# Patient Record
Sex: FEMALE | Race: WHITE | NOT HISPANIC OR LATINO | Employment: FULL TIME | URBAN - METROPOLITAN AREA
[De-identification: names, ages, dates, MRNs, and addresses within clinical notes are randomized per-mention and may not be internally consistent; named-entity substitution may affect disease eponyms.]

---

## 2023-11-04 PROBLEM — T22.011A: Status: ACTIVE | Noted: 2023-11-04

## 2023-11-04 PROBLEM — M48.062 LUMBAR STENOSIS WITH NEUROGENIC CLAUDICATION: Status: ACTIVE | Noted: 2023-11-04

## 2023-11-04 PROBLEM — M54.30 SCIATICA: Status: ACTIVE | Noted: 2023-11-04

## 2023-11-04 PROBLEM — M51.16 LUMBAR DISC HERNIATION WITH RADICULOPATHY: Status: ACTIVE | Noted: 2023-11-04

## 2023-11-04 PROBLEM — R07.9 CHEST PAIN: Status: ACTIVE | Noted: 2023-11-04

## 2023-11-04 PROBLEM — F41.9 ANXIETY: Status: ACTIVE | Noted: 2023-11-04

## 2023-11-04 PROBLEM — L30.9 ECZEMA: Status: ACTIVE | Noted: 2023-11-04

## 2023-11-04 PROBLEM — R22.32: Status: ACTIVE | Noted: 2023-11-04

## 2023-11-04 PROBLEM — J45.909 ASTHMA (HHS-HCC): Status: ACTIVE | Noted: 2023-11-04

## 2023-11-04 PROBLEM — M54.17 LUMBOSACRAL NEURITIS: Status: ACTIVE | Noted: 2023-11-04

## 2023-11-04 RX ORDER — SERTRALINE HYDROCHLORIDE 50 MG/1
1 TABLET, FILM COATED ORAL DAILY
COMMUNITY
Start: 2019-02-14

## 2023-11-04 RX ORDER — FLUCONAZOLE 150 MG/1
1 TABLET ORAL ONCE
COMMUNITY
Start: 2021-06-25

## 2023-11-04 RX ORDER — IBUPROFEN 600 MG/1
1 TABLET ORAL EVERY 6 HOURS PRN
COMMUNITY
Start: 2019-07-15

## 2023-11-04 RX ORDER — LEVONORGESTREL 52 MG/1
INTRAUTERINE DEVICE INTRAUTERINE
COMMUNITY
Start: 2019-06-25

## 2023-11-04 RX ORDER — CLOBETASOL PROPIONATE 0.5 MG/ML
LOTION TOPICAL 2 TIMES DAILY PRN
COMMUNITY

## 2023-11-04 RX ORDER — DOCUSATE SODIUM 100 MG/1
1 CAPSULE, LIQUID FILLED ORAL 2 TIMES DAILY
COMMUNITY
Start: 2019-07-15

## 2023-11-04 RX ORDER — CLONAZEPAM 0.5 MG/1
TABLET ORAL
COMMUNITY

## 2023-11-06 ENCOUNTER — APPOINTMENT (OUTPATIENT)
Dept: OBSTETRICS AND GYNECOLOGY | Facility: CLINIC | Age: 38
End: 2023-11-06
Payer: COMMERCIAL

## 2023-12-11 ENCOUNTER — OFFICE VISIT (OUTPATIENT)
Dept: OBSTETRICS AND GYNECOLOGY | Facility: CLINIC | Age: 38
End: 2023-12-11
Payer: COMMERCIAL

## 2023-12-11 VITALS
HEIGHT: 62 IN | SYSTOLIC BLOOD PRESSURE: 102 MMHG | BODY MASS INDEX: 22.08 KG/M2 | DIASTOLIC BLOOD PRESSURE: 60 MMHG | WEIGHT: 120 LBS

## 2023-12-11 DIAGNOSIS — Z01.419 WELL WOMAN EXAM WITH ROUTINE GYNECOLOGICAL EXAM: Primary | ICD-10-CM

## 2023-12-11 PROCEDURE — 1036F TOBACCO NON-USER: CPT | Performed by: OBSTETRICS & GYNECOLOGY

## 2023-12-11 PROCEDURE — 99395 PREV VISIT EST AGE 18-39: CPT | Performed by: OBSTETRICS & GYNECOLOGY

## 2023-12-11 PROCEDURE — 87624 HPV HI-RISK TYP POOLED RSLT: CPT

## 2023-12-11 PROCEDURE — 88175 CYTOPATH C/V AUTO FLUID REDO: CPT

## 2023-12-11 NOTE — PROGRESS NOTES
"Catarina Marquez is a 38 y.o. female who is here for a routine exam.     Patient has an IUD in place and states she randomly has spotting for only a few days    Sexually active: Mirena IUD  Mirena inserted June 2019.  Due out June 2027    Regular self breast exam: yes  No concerns on her exams    Menstrual History:  OB History    No obstetric history on file.        Patient's last menstrual period was 12/08/2023.         Review of Systems  All Normal Review of Systems  Constitutional: no fever, no chills, no recent weight gain, no recent weight loss and no fatigue.    Gastrointestinal: no abdominal pain, no constipation, no nausea, no diarrhea and no vomiting.    Genitourinary: no dysuria, no urinary incontinence, no vaginal dryness, no vaginal itching, no dyspareunia, no pelvic pain, no dysmenorrhea, no sexual problems, no change in urinary frequency, no vaginal discharge, no unexplained vaginal bleeding and no lesion/sore.    Breasts: No masses.  No nipple discharge.  No redness    Objective   /60   Ht 1.575 m (5' 2\")   Wt 54.4 kg (120 lb)   LMP 12/08/2023 Comment: IUD  BMI 21.95 kg/m²     OBGyn Exam   Physical Exam  Constitutional: Alert and in no acute distress. Well developed, well nourished.   Head and Face: Head and face: Normal.    Eyes: Normal external exam - nonicteric sclera, extraocular movements intact (EOMI) and no ptosis.   Ears, Nose, Mouth, and Throat: External inspection of ears and nose: Normal.    Neck: No neck asymmetry. Supple. Thyroid not enlarged and there were no palpable thyroid nodules.   Chest: Breasts: Normal appearance, no nipple discharge and no skin changes. Palpation of breasts and axillae: No palpable mass and no axillary lymphadenopathy.   Abdomen: Soft nontender; no abdominal mass palpated. No organomegaly. No hernias.     Genitourinary:   External genitalia: Normal. No inguinal lymphadenopathy. Bartholin's Urethral and Skenes Glands: Normal. Urethra: Normal.    Bladder: " Normal on palpation.   Vagina: Normal. No discharge.  End of cycle with trace blood  Cervix: Normal.  IUD strings visualized and palpated  Uterus: Normal.    Right Adnexa/parametria: Normal.  Left Adnexa/parametria: Normal.    Inspection of Perianal Area: Normal.     Musculoskeletal: No joint swelling seen, normal movements of all extremities.   Skin: Normal skin color and pigmentation, normal skin turgor, and no rash.   Neurologic: Non-focal. Grossly intact.   Psychiatric: Alert and oriented x 3. Affect normal to patient baseline. Mood: Appropriate.      Assessment/Plan   1) Annual exam:  Pap with HPV testing completed today  Mirena IUD in place.  New FDA guidelines reviewed and her current IUD will be good through June 2027    Thank you again for your visit to our office today  Please follow-up as needed or in 1 year with your annual exam

## 2024-09-05 ENCOUNTER — TELEPHONE (OUTPATIENT)
Dept: OBSTETRICS AND GYNECOLOGY | Facility: CLINIC | Age: 39
End: 2024-09-05
Payer: COMMERCIAL

## 2024-09-05 DIAGNOSIS — N39.0 ACUTE UTI: Primary | ICD-10-CM

## 2024-09-05 RX ORDER — SULFAMETHOXAZOLE AND TRIMETHOPRIM 800; 160 MG/1; MG/1
1 TABLET ORAL 2 TIMES DAILY
Qty: 14 TABLET | Refills: 0 | Status: SHIPPED | OUTPATIENT
Start: 2024-09-05 | End: 2024-09-12

## 2024-09-05 NOTE — TELEPHONE ENCOUNTER
Catarina JACOB Marquez called in stating that she has burning and pressure and thinks its a UTI.      Last Office Visit: 12/11/2023  Next Office Visit: 12/16/2024  Med: unsure   Pharmacy: [facility]

## 2024-10-14 DIAGNOSIS — J30.81 ALLERGY TO DOGS: Primary | ICD-10-CM

## 2024-10-14 RX ORDER — EPINEPHRINE 0.3 MG/.3ML
0.3 INJECTION SUBCUTANEOUS ONCE
Qty: 1 EACH | Refills: 0 | Status: SHIPPED | OUTPATIENT
Start: 2024-10-14 | End: 2024-10-14

## 2024-12-16 ENCOUNTER — APPOINTMENT (OUTPATIENT)
Dept: OBSTETRICS AND GYNECOLOGY | Facility: CLINIC | Age: 39
End: 2024-12-16
Payer: COMMERCIAL

## 2024-12-16 VITALS
WEIGHT: 123 LBS | HEIGHT: 62 IN | BODY MASS INDEX: 22.63 KG/M2 | DIASTOLIC BLOOD PRESSURE: 66 MMHG | SYSTOLIC BLOOD PRESSURE: 106 MMHG

## 2024-12-16 DIAGNOSIS — Z30.431 SURVEILLANCE FOR BIRTH CONTROL, INTRAUTERINE DEVICE: ICD-10-CM

## 2024-12-16 DIAGNOSIS — Z01.419 WELL WOMAN EXAM WITH ROUTINE GYNECOLOGICAL EXAM: Primary | ICD-10-CM

## 2024-12-16 PROCEDURE — 99395 PREV VISIT EST AGE 18-39: CPT | Performed by: OBSTETRICS & GYNECOLOGY

## 2024-12-16 PROCEDURE — 3008F BODY MASS INDEX DOCD: CPT | Performed by: OBSTETRICS & GYNECOLOGY

## 2024-12-16 PROCEDURE — 87624 HPV HI-RISK TYP POOLED RSLT: CPT

## 2024-12-16 PROCEDURE — 1036F TOBACCO NON-USER: CPT | Performed by: OBSTETRICS & GYNECOLOGY

## 2024-12-16 ASSESSMENT — PATIENT HEALTH QUESTIONNAIRE - PHQ9
SUM OF ALL RESPONSES TO PHQ9 QUESTIONS 1 AND 2: 0
2. FEELING DOWN, DEPRESSED OR HOPELESS: NOT AT ALL
1. LITTLE INTEREST OR PLEASURE IN DOING THINGS: NOT AT ALL

## 2024-12-16 NOTE — PROGRESS NOTES
"Catarina Marquez is a 39 y.o. female who is here for a annual exam.     Patient with random spotting with her Mirena IUD      Sexually active: Mirena IUD in place and good through     Regular self breast exam: yes  no concerns on her exams    Menstrual History:  OB History          4    Para   4    Term                AB        Living   4         SAB        IAB        Ectopic        Multiple        Live Births                    No LMP recorded (lmp unknown). (Menstrual status: IUD).         Review of Systems  All Normal Review of Systems  Constitutional: no fever, no chills, no recent weight gain, no recent weight loss and no fatigue.    Gastrointestinal: no abdominal pain, no constipation, no nausea, no diarrhea and no vomiting.    Genitourinary: no dysuria, no urinary incontinence, no vaginal dryness, no vaginal itching, no dyspareunia, no pelvic pain, no dysmenorrhea, no sexual problems, no change in urinary frequency, no vaginal discharge, no unexplained vaginal bleeding and no lesion/sore.    Breasts: No masses.  No nipple discharge.  No redness    Objective   /66   Ht 1.575 m (5' 2\")   Wt 55.8 kg (123 lb)   LMP  (LMP Unknown) Comment: Spotting only with IUD  BMI 22.50 kg/m²     OBGyn Exam   Physical Exam  Constitutional: Alert and in no acute distress. Well developed, well nourished.   Head and Face: Head and face: Normal.    Eyes: Normal external exam - nonicteric sclera, extraocular movements intact (EOMI) and no ptosis.   Ears, Nose, Mouth, and Throat: External inspection of ears and nose: Normal.    Neck: No neck asymmetry. Supple. Thyroid not enlarged and there were no palpable thyroid nodules.   Chest: Breasts: Normal appearance, no nipple discharge and no skin changes. Palpation of breasts and axillae: No palpable mass and no axillary lymphadenopathy.   Abdomen: Soft nontender; no abdominal mass palpated. No organomegaly. No hernias.     Genitourinary:   External genitalia: " Normal. No inguinal lymphadenopathy. Bartholin's Urethral and Skenes Glands: Normal. Urethra: Normal.    Bladder: Normal on palpation.   Vagina: Normal. No discharge  Cervix: Normal.  Used D strings visualized and palpated  Uterus: Normal.    Right Adnexa/parametria: Normal.  Left Adnexa/parametria: Normal.    Inspection of Perianal Area: Normal.     Musculoskeletal: No joint swelling seen, normal movements of all extremities.   Skin: Normal skin color and pigmentation, normal skin turgor, and no rash.   Neurologic: Non-focal. Grossly intact.   Psychiatric: Alert and oriented x 3. Affect normal to patient baseline. Mood: Appropriate.      Assessment/Plan   1) Annual exam:  Pap with HPV testing completed  Mirena IUD in place and good through 2027    Thank you again for your visit to our office today  Please follow-up as needed or in 1 year with your annual exam

## 2024-12-31 LAB
CYTOLOGY CMNT CVX/VAG CYTO-IMP: NORMAL
HPV HR 12 DNA GENITAL QL NAA+PROBE: NEGATIVE
HPV HR GENOTYPES PNL CVX NAA+PROBE: NEGATIVE
HPV16 DNA SPEC QL NAA+PROBE: NEGATIVE
HPV18 DNA SPEC QL NAA+PROBE: NEGATIVE
LAB AP HPV GENOTYPE QUESTION: YES
LAB AP HPV HR: NORMAL
LABORATORY COMMENT REPORT: NORMAL
PATH REPORT.TOTAL CANCER: NORMAL

## 2025-05-06 ENCOUNTER — TELEPHONE (OUTPATIENT)
Dept: ORTHOPEDIC SURGERY | Facility: CLINIC | Age: 40
End: 2025-05-06
Payer: COMMERCIAL

## 2025-05-06 NOTE — TELEPHONE ENCOUNTER
Dr. Franklin reached out to me about his neighbor that accidentally cut her finger on a  blade and she needed to be seen ASAP for possible tendon injury.     Attempted to call patient, no answer. Left voicemail to return call.    If patient calls back please schedule her with Dr. Ruiz on Thursday morning at 8:30AM. If she calls soon, I may be able to double book her on today's schedule in [place].

## 2025-05-08 ENCOUNTER — HOSPITAL ENCOUNTER (OUTPATIENT)
Dept: RADIOLOGY | Facility: CLINIC | Age: 40
Discharge: HOME | End: 2025-05-08
Payer: COMMERCIAL

## 2025-05-08 ENCOUNTER — APPOINTMENT (OUTPATIENT)
Dept: ORTHOPEDIC SURGERY | Facility: CLINIC | Age: 40
End: 2025-05-08
Payer: COMMERCIAL

## 2025-05-08 ENCOUNTER — OFFICE VISIT (OUTPATIENT)
Dept: ORTHOPEDIC SURGERY | Facility: CLINIC | Age: 40
End: 2025-05-08
Payer: COMMERCIAL

## 2025-05-08 DIAGNOSIS — S61.012A THUMB LACERATION, LEFT, INITIAL ENCOUNTER: Primary | ICD-10-CM

## 2025-05-08 DIAGNOSIS — M25.461 PAIN AND SWELLING OF RIGHT KNEE: ICD-10-CM

## 2025-05-08 DIAGNOSIS — S61.012A THUMB LACERATION, LEFT, INITIAL ENCOUNTER: ICD-10-CM

## 2025-05-08 DIAGNOSIS — M25.561 PAIN AND SWELLING OF RIGHT KNEE: ICD-10-CM

## 2025-05-08 PROCEDURE — 1036F TOBACCO NON-USER: CPT | Performed by: ORTHOPAEDIC SURGERY

## 2025-05-08 PROCEDURE — 73130 X-RAY EXAM OF HAND: CPT | Mod: LT

## 2025-05-08 PROCEDURE — 99204 OFFICE O/P NEW MOD 45 MIN: CPT | Performed by: ORTHOPAEDIC SURGERY

## 2025-05-08 PROCEDURE — 99214 OFFICE O/P EST MOD 30 MIN: CPT | Mod: 25 | Performed by: ORTHOPAEDIC SURGERY

## 2025-05-08 PROCEDURE — 10060 I&D ABSCESS SIMPLE/SINGLE: CPT | Performed by: ORTHOPAEDIC SURGERY

## 2025-05-08 RX ORDER — SULFAMETHOXAZOLE AND TRIMETHOPRIM 800; 160 MG/1; MG/1
1 TABLET ORAL 2 TIMES DAILY
Qty: 14 TABLET | Refills: 0 | Status: SHIPPED | OUTPATIENT
Start: 2025-05-08 | End: 2025-05-15

## 2025-05-08 ASSESSMENT — PAIN SCALES - GENERAL: PAINLEVEL_OUTOF10: 6

## 2025-05-08 ASSESSMENT — PAIN - FUNCTIONAL ASSESSMENT: PAIN_FUNCTIONAL_ASSESSMENT: 0-10

## 2025-05-08 NOTE — H&P (VIEW-ONLY)
History of Present Illness:  Chief Complaint   Patient presents with    Left Hand - Laceration     With possible tendon injury       Patient presents today for evaluation of left thumb laceration that initially occurred 6 days ago.  Laceration occurred when she was handling a  blade.  She had immediate inability to flex at her thumb IP joint.  The laceration was initially irrigated/reapproximated by a plastic surgeon.  She has also noted some increased soreness about the ulnar aspect of the laceration with some swelling and redness over the last couple days.  No numbness or tingling.  No fevers or chills.  No drainage.    Medical History[1]    Medication Documentation Review Audit       Reviewed by Saba Davis CMA (Medical Assistant) on 25 at 0900      Medication Order Taking? Sig Documenting Provider Last Dose Status   clobetasoL 0.05 % lotion [id number]  Apply topically 2 times a day as needed.  Apply topically to affected area Historical Provider, MD  Active   clonazePAM (KlonoPIN) 0.5 mg tablet [id number]  Take by mouth. As directed. Historical Provider, MD  Active   docusate sodium (Colace) 100 mg capsule [id number]  Take 1 capsule (100 mg) by mouth twice a day. Historical Provider, MD  Active   EPINEPHrine (Epipen) 0.3 mg/0.3 mL injection syringe [id number]  Inject 0.3 mL (0.3 mg) into the muscle 1 time for 1 dose. use as directed for allergic reaction and then call 911 Ramona Ku MD   10/14/24 3503   fluconazole (Diflucan) 150 mg tablet [id number]  Take 1 tablet (150 mg) by mouth 1 time. Historical Provider, MD  Active   ibuprofen (IBU) 600 mg tablet [id number]  Take 1 tablet (600 mg) by mouth every 6 hours if needed for mild pain (1 - 3). Historical Provider, MD  Active   levonorgestrel (Mirena) 21 mcg/24 hours (8 yrs) 52 mg IUD [id number]  by intrauterine route. As directed.  Inserted 2019.  Due out 2027 Historical Provider, MD  Active   sertraline (Zoloft) 50 mg tablet [id number]   Take 1 tablet (50 mg) by mouth once daily. Historical Provider, MD  Active   sodium chloride (Ocean) 0.65 % nasal spray [id number]  Administer 2 sprays into each nostril 4 times a day as needed. Historical Provider, MD  Active                    RX Allergies[2]    Social History     Socioeconomic History    Marital status:      Spouse name: Not on file    Number of children: Not on file    Years of education: Not on file    Highest education level: Not on file   Occupational History    Not on file   Tobacco Use    Smoking status: Never    Smokeless tobacco: Never   Vaping Use    Vaping status: Never Used   Substance and Sexual Activity    Alcohol use: Not Currently    Drug use: Never    Sexual activity: Yes     Partners: Male     Birth control/protection: I.U.D.   Other Topics Concern    Not on file   Social History Narrative    Not on file     Social Drivers of Health     Financial Resource Strain: Low Risk  (7/15/2024)    Received from [facility]    Overall Financial Resource Strain (CARDIA)     Difficulty of Paying Living Expenses: Not hard at all   Food Insecurity: No Food Insecurity (7/15/2024)    Received from [facility]    Hunger Vital Sign     Worried About Running Out of Food in the Last Year: Never true     Ran Out of Food in the Last Year: Never true   Transportation Needs: No Transportation Needs (7/15/2024)    Received from [facility]    PRAPARE - Transportation     Lack of Transportation (Medical): No     Lack of Transportation (Non-Medical): No   Physical Activity: Sufficiently Active (7/15/2024)    Received from [facility]    Exercise Vital Sign     Days of Exercise per Week: 5 days     Minutes of Exercise per Session: 40 min   Stress: Stress Concern Present (7/15/2024)    Received from [facility]    [place] [employer] - Occupational Stress Questionnaire     Feeling of Stress : Very much   Social Connections: Socially Integrated (7/15/2024)     Received from [facility]    Social Connection and Isolation Panel [NHANES]     Frequency of Communication with Friends and Family: More than three times a week     Frequency of Social Gatherings with Friends and Family: More than three times a week     Attends [employer] Services: More than 4 times per year     Active Member of Clubs or Organizations: Yes     Attends Club or Organization Meetings: More than 4 times per year     Marital Status:    Intimate Partner Violence: Not on file   Housing Stability: Unknown (7/15/2024)    Received from [facility]    Housing Stability Vital Sign     Unable to Pay for Housing in the Last Year: No     Number of Places Lived in the Last Year: Not on file     Unstable Housing in the Last Year: No       Surgical History[3]     Review of Systems   GENERAL: Negative for malaise, significant weight loss, fever  MUSCULOSKELETAL: see HPI  NEURO:  Negative     Physical Examination  Constitutional: Appears well-developed and well-nourished.  Head: Normocephalic and atraumatic.  Eyes: EOMI grossly  Cardiovascular: Intact distal pulses.   Respiratory: Effort normal. No respiratory distress.  Neurologic: Alert and oriented to person, place, and time.  Skin: Skin is warm and dry.  Hematologic / Lymphatic: No lymphedema, lymphangitis.  Psychiatric: normal mood and affect. Behavior is normal.   Musculoskeletal:  Left thumb: Transverse laceration just proximal to IP flexion crease measuring approximately 2 cm in length.  There is significant erythema and swelling about the ulnar aspect of this healing wound with 1 area that appears to have some purulence immediately under the skin.  Tenderness in this area.  No tenderness extending proximally along the flexor sheath.  No active FPL function.  Able to passively flex thumb IP joint.  Capillary refill less than 2 seconds distally.  Sensation grossly intact about the radial and ulnar aspects of the thumb.    Radiographs: Left thumb  radiographs ordered and available for my review/interpretation: No fracture/dislocation.  Joint spaces maintained.  No radiopaque foreign bodies.     Assessment:  Patient with left thumb laceration with associated FPL injury and now with developing abscess that is grossly superficial on exam     Plan:  Nature of the diagnosis was discussed with the patient.  We discussed inherent difficulties in addressing flexor tendon lacerations.  This is certainly complicated by the fact that she currently has a superficial infection.  We discussed risks and benefits of nonoperative versus operative treatment options for the flexor tendon and patient is interested in proceeding with attempted flexor tendon repair.  We discussed recommendation for bedside debridement of the superficial abscess in hopes that this clears up prior to surgical intervention for her flexor tendon.  After thoroughly reviewing patient wished to proceed.    Patient ID: Catarina Marquez is a 39 y.o. female.    Incision and Drainage [[id number]]    Date/Time: 5/8/2025 6:42 PM    Performed by: Sumanth Ruiz MD  Authorized by: Sumanth Ruiz MD    Consent:     Consent obtained:  Verbal    Consent given by:  Patient    Risks, benefits, and alternatives were discussed: yes    Universal protocol:     Procedure explained and questions answered to patient or proxy's satisfaction: yes      Test results available : yes      Imaging studies available: yes    Location:     Type:  Abscess    Location: Left thumb.  Pre-procedure details:     Skin preparation:  Povidone-iodine  Sedation:     Sedation type:  None  Anesthesia:     Anesthesia method:  Local infiltration    Local anesthetic:  Lidocaine 1% w/o epi  Procedure type:     Complexity:  Simple  Procedure details:     Ultrasound guidance: no      Needle aspiration: no      Incision type: Removal of suture and spreading.    Incision depth:  Dermal    Wound management:  Probed and deloculated    Drainage:   Purulent    Wound treatment:  Wound left open    Packing material: Small gauze packed superficially.  Post-procedure details:     Procedure completion:  Tolerated  Culture sent.  New prescription for Bactrim sent to patient's pharmacy.       Patient will begin with 3 times a day soaks in antibacterial soapy water for 10 to 15 minutes.  Instructed to report to emergency room if any acute worsening.  Plan to tentatively proceed with FPL tendon repair this coming Wednesday as long as infection appears to be grossly improved.  We did discuss potential increased risk of tendon repair issues if ongoing infection.    Risks and benefits of continued nonoperative versus operative treatment options were reviewed.  We also discussed potential for IP joint fusion as an alternative treatment option.  The surgical benefits and the potential complications were reviewed with the patient today, as well as the day surgical setting and the likely postoperative course.  Patient understands that the goal of surgery is to improve functional recovery.  Risks discussed included, but were not limited to, residual/recurrent/worsening symptoms, pain, infection, bleeding, stiffness, injury to nerve/blood vessels/tendons, wound healing issues and possible need for reoperation.        [1]   Past Medical History:  Diagnosis Date    Acute upper respiratory infection, unspecified 05/25/2017    Acute upper respiratory infection    Personal history of other diseases of the nervous system and sense organs     History of migraine headaches    Umbilical hernia without obstruction or gangrene 06/11/2019    Umbilical hernia without obstruction and without gangrene   [2]   Allergies  Allergen Reactions    Neomycin Swelling   [3]   Past Surgical History:  Procedure Laterality Date    BACK SURGERY  06/20/2016    Lower Back Surgery    OTHER SURGICAL HISTORY  08/27/2019    Umbilical hernia repair

## 2025-05-08 NOTE — PROGRESS NOTES
History of Present Illness:  Chief Complaint   Patient presents with    Left Hand - Laceration     With possible tendon injury       Patient presents today for evaluation of left thumb laceration that initially occurred 6 days ago.  Laceration occurred when she was handling a  blade.  She had immediate inability to flex at her thumb IP joint.  The laceration was initially irrigated/reapproximated by a plastic surgeon.  She has also noted some increased soreness about the ulnar aspect of the laceration with some swelling and redness over the last couple days.  No numbness or tingling.  No fevers or chills.  No drainage.    Medical History[1]    Medication Documentation Review Audit       Reviewed by Saba Davis CMA (Medical Assistant) on 25 at 0900      Medication Order Taking? Sig Documenting Provider Last Dose Status   clobetasoL 0.05 % lotion [id number]  Apply topically 2 times a day as needed.  Apply topically to affected area Historical Provider, MD  Active   clonazePAM (KlonoPIN) 0.5 mg tablet [id number]  Take by mouth. As directed. Historical Provider, MD  Active   docusate sodium (Colace) 100 mg capsule [id number]  Take 1 capsule (100 mg) by mouth twice a day. Historical Provider, MD  Active   EPINEPHrine (Epipen) 0.3 mg/0.3 mL injection syringe [id number]  Inject 0.3 mL (0.3 mg) into the muscle 1 time for 1 dose. use as directed for allergic reaction and then call 911 Ramona Ku MD   10/14/24 7509   fluconazole (Diflucan) 150 mg tablet [id number]  Take 1 tablet (150 mg) by mouth 1 time. Historical Provider, MD  Active   ibuprofen (IBU) 600 mg tablet [id number]  Take 1 tablet (600 mg) by mouth every 6 hours if needed for mild pain (1 - 3). Historical Provider, MD  Active   levonorgestrel (Mirena) 21 mcg/24 hours (8 yrs) 52 mg IUD [id number]  by intrauterine route. As directed.  Inserted 2019.  Due out 2027 Historical Provider, MD  Active   sertraline (Zoloft) 50 mg tablet [id number]   Take 1 tablet (50 mg) by mouth once daily. Historical Provider, MD  Active   sodium chloride (Ocean) 0.65 % nasal spray [id number]  Administer 2 sprays into each nostril 4 times a day as needed. Historical Provider, MD  Active                    RX Allergies[2]    Social History     Socioeconomic History    Marital status:      Spouse name: Not on file    Number of children: Not on file    Years of education: Not on file    Highest education level: Not on file   Occupational History    Not on file   Tobacco Use    Smoking status: Never    Smokeless tobacco: Never   Vaping Use    Vaping status: Never Used   Substance and Sexual Activity    Alcohol use: Not Currently    Drug use: Never    Sexual activity: Yes     Partners: Male     Birth control/protection: I.U.D.   Other Topics Concern    Not on file   Social History Narrative    Not on file     Social Drivers of Health     Financial Resource Strain: Low Risk  (7/15/2024)    Received from [facility]    Overall Financial Resource Strain (CARDIA)     Difficulty of Paying Living Expenses: Not hard at all   Food Insecurity: No Food Insecurity (7/15/2024)    Received from [facility]    Hunger Vital Sign     Worried About Running Out of Food in the Last Year: Never true     Ran Out of Food in the Last Year: Never true   Transportation Needs: No Transportation Needs (7/15/2024)    Received from [facility]    PRAPARE - Transportation     Lack of Transportation (Medical): No     Lack of Transportation (Non-Medical): No   Physical Activity: Sufficiently Active (7/15/2024)    Received from [facility]    Exercise Vital Sign     Days of Exercise per Week: 5 days     Minutes of Exercise per Session: 40 min   Stress: Stress Concern Present (7/15/2024)    Received from [facility]    [employer] - Occupational Stress Questionnaire     Feeling of Stress : Very much   Social Connections: Socially Integrated (7/15/2024)     Received from [facility]    Social Connection and Isolation Panel [NHANES]     Frequency of Communication with Friends and Family: More than three times a week     Frequency of Social Gatherings with Friends and Family: More than three times a week     Attends [employer] Services: More than 4 times per year     Active Member of Clubs or Organizations: Yes     Attends Club or Organization Meetings: More than 4 times per year     Marital Status:    Intimate Partner Violence: Not on file   Housing Stability: Unknown (7/15/2024)    Received from [facility]    Housing Stability Vital Sign     Unable to Pay for Housing in the Last Year: No     Number of Places Lived in the Last Year: Not on file     Unstable Housing in the Last Year: No       Surgical History[3]     Review of Systems   GENERAL: Negative for malaise, significant weight loss, fever  MUSCULOSKELETAL: see HPI  NEURO:  Negative     Physical Examination  Constitutional: Appears well-developed and well-nourished.  Head: Normocephalic and atraumatic.  Eyes: EOMI grossly  Cardiovascular: Intact distal pulses.   Respiratory: Effort normal. No respiratory distress.  Neurologic: Alert and oriented to person, place, and time.  Skin: Skin is warm and dry.  Hematologic / Lymphatic: No lymphedema, lymphangitis.  Psychiatric: normal mood and affect. Behavior is normal.   Musculoskeletal:  Left thumb: Transverse laceration just proximal to IP flexion crease measuring approximately 2 cm in length.  There is significant erythema and swelling about the ulnar aspect of this healing wound with 1 area that appears to have some purulence immediately under the skin.  Tenderness in this area.  No tenderness extending proximally along the flexor sheath.  No active FPL function.  Able to passively flex thumb IP joint.  Capillary refill less than 2 seconds distally.  Sensation grossly intact about the radial and ulnar aspects of the thumb.    Radiographs: Left thumb  radiographs ordered and available for my review/interpretation: No fracture/dislocation.  Joint spaces maintained.  No radiopaque foreign bodies.     Assessment:  Patient with left thumb laceration with associated FPL injury and now with developing abscess that is grossly superficial on exam     Plan:  Nature of the diagnosis was discussed with the patient.  We discussed inherent difficulties in addressing flexor tendon lacerations.  This is certainly complicated by the fact that she currently has a superficial infection.  We discussed risks and benefits of nonoperative versus operative treatment options for the flexor tendon and patient is interested in proceeding with attempted flexor tendon repair.  We discussed recommendation for bedside debridement of the superficial abscess in hopes that this clears up prior to surgical intervention for her flexor tendon.  After thoroughly reviewing patient wished to proceed.    Patient ID: Catarina Marquez is a 39 y.o. female.    Incision and Drainage [[id number]]    Date/Time: 5/8/2025 6:42 PM    Performed by: Sumanth Ruiz MD  Authorized by: Sumanth Ruiz MD    Consent:     Consent obtained:  Verbal    Consent given by:  Patient    Risks, benefits, and alternatives were discussed: yes    Universal protocol:     Procedure explained and questions answered to patient or proxy's satisfaction: yes      Test results available : yes      Imaging studies available: yes    Location:     Type:  Abscess    Location: Left thumb.  Pre-procedure details:     Skin preparation:  Povidone-iodine  Sedation:     Sedation type:  None  Anesthesia:     Anesthesia method:  Local infiltration    Local anesthetic:  Lidocaine 1% w/o epi  Procedure type:     Complexity:  Simple  Procedure details:     Ultrasound guidance: no      Needle aspiration: no      Incision type: Removal of suture and spreading.    Incision depth:  Dermal    Wound management:  Probed and deloculated    Drainage:   Purulent    Wound treatment:  Wound left open    Packing material: Small gauze packed superficially.  Post-procedure details:     Procedure completion:  Tolerated  Culture sent.  New prescription for Bactrim sent to patient's pharmacy.       Patient will begin with 3 times a day soaks in antibacterial soapy water for 10 to 15 minutes.  Instructed to report to emergency room if any acute worsening.  Plan to tentatively proceed with FPL tendon repair this coming Wednesday as long as infection appears to be grossly improved.  We did discuss potential increased risk of tendon repair issues if ongoing infection.    Risks and benefits of continued nonoperative versus operative treatment options were reviewed.  We also discussed potential for IP joint fusion as an alternative treatment option.  The surgical benefits and the potential complications were reviewed with the patient today, as well as the day surgical setting and the likely postoperative course.  Patient understands that the goal of surgery is to improve functional recovery.  Risks discussed included, but were not limited to, residual/recurrent/worsening symptoms, pain, infection, bleeding, stiffness, injury to nerve/blood vessels/tendons, wound healing issues and possible need for reoperation.        [1]   Past Medical History:  Diagnosis Date    Acute upper respiratory infection, unspecified 05/25/2017    Acute upper respiratory infection    Personal history of other diseases of the nervous system and sense organs     History of migraine headaches    Umbilical hernia without obstruction or gangrene 06/11/2019    Umbilical hernia without obstruction and without gangrene   [2]   Allergies  Allergen Reactions    Neomycin Swelling   [3]   Past Surgical History:  Procedure Laterality Date    BACK SURGERY  06/20/2016    Lower Back Surgery    OTHER SURGICAL HISTORY  08/27/2019    Umbilical hernia repair

## 2025-05-12 ENCOUNTER — ANESTHESIA EVENT (OUTPATIENT)
Dept: OPERATING ROOM | Facility: HOSPITAL | Age: 40
End: 2025-05-12
Payer: COMMERCIAL

## 2025-05-13 DIAGNOSIS — S61.012A THUMB LACERATION, LEFT, INITIAL ENCOUNTER: ICD-10-CM

## 2025-05-14 ENCOUNTER — ANESTHESIA (OUTPATIENT)
Dept: OPERATING ROOM | Facility: HOSPITAL | Age: 40
End: 2025-05-14
Payer: COMMERCIAL

## 2025-05-14 ENCOUNTER — HOSPITAL ENCOUNTER (OUTPATIENT)
Facility: HOSPITAL | Age: 40
Setting detail: OUTPATIENT SURGERY
Discharge: HOME | End: 2025-05-14
Attending: ORTHOPAEDIC SURGERY | Admitting: ORTHOPAEDIC SURGERY
Payer: COMMERCIAL

## 2025-05-14 VITALS
OXYGEN SATURATION: 99 % | WEIGHT: 120.81 LBS | HEIGHT: 62 IN | RESPIRATION RATE: 15 BRPM | HEART RATE: 69 BPM | DIASTOLIC BLOOD PRESSURE: 63 MMHG | SYSTOLIC BLOOD PRESSURE: 115 MMHG | TEMPERATURE: 97.3 F | BODY MASS INDEX: 22.23 KG/M2

## 2025-05-14 DIAGNOSIS — S61.012A THUMB LACERATION, LEFT, INITIAL ENCOUNTER: Primary | ICD-10-CM

## 2025-05-14 LAB — PREGNANCY TEST URINE, POC: NEGATIVE

## 2025-05-14 PROCEDURE — 81025 URINE PREGNANCY TEST: CPT | Performed by: ORTHOPAEDIC SURGERY

## 2025-05-14 PROCEDURE — 2500000004 HC RX 250 GENERAL PHARMACY W/ HCPCS (ALT 636 FOR OP/ED): Performed by: ORTHOPAEDIC SURGERY

## 2025-05-14 PROCEDURE — A26356 PR REPAIR FLEX TENDON,ZONE 2,HAND: Performed by: ANESTHESIOLOGY

## 2025-05-14 PROCEDURE — 7100000010 HC PHASE TWO TIME - EACH INCREMENTAL 1 MINUTE: Performed by: ORTHOPAEDIC SURGERY

## 2025-05-14 PROCEDURE — 3600000008 HC OR TIME - EACH INCREMENTAL 1 MINUTE - PROCEDURE LEVEL THREE: Performed by: ORTHOPAEDIC SURGERY

## 2025-05-14 PROCEDURE — 7100000001 HC RECOVERY ROOM TIME - INITIAL BASE CHARGE: Performed by: ORTHOPAEDIC SURGERY

## 2025-05-14 PROCEDURE — 3700000001 HC GENERAL ANESTHESIA TIME - INITIAL BASE CHARGE: Performed by: ORTHOPAEDIC SURGERY

## 2025-05-14 PROCEDURE — 2500000004 HC RX 250 GENERAL PHARMACY W/ HCPCS (ALT 636 FOR OP/ED): Performed by: NURSE ANESTHETIST, CERTIFIED REGISTERED

## 2025-05-14 PROCEDURE — 3700000002 HC GENERAL ANESTHESIA TIME - EACH INCREMENTAL 1 MINUTE: Performed by: ORTHOPAEDIC SURGERY

## 2025-05-14 PROCEDURE — 26356 REPAIR FINGER/HAND TENDON: CPT | Performed by: ORTHOPAEDIC SURGERY

## 2025-05-14 PROCEDURE — 7100000002 HC RECOVERY ROOM TIME - EACH INCREMENTAL 1 MINUTE: Performed by: ORTHOPAEDIC SURGERY

## 2025-05-14 PROCEDURE — 2500000004 HC RX 250 GENERAL PHARMACY W/ HCPCS (ALT 636 FOR OP/ED): Mod: JZ | Performed by: ANESTHESIOLOGY

## 2025-05-14 PROCEDURE — A26356 PR REPAIR FLEX TENDON,ZONE 2,HAND: Performed by: NURSE ANESTHETIST, CERTIFIED REGISTERED

## 2025-05-14 PROCEDURE — 87186 SC STD MICRODIL/AGAR DIL: CPT | Mod: GEALAB | Performed by: ORTHOPAEDIC SURGERY

## 2025-05-14 PROCEDURE — 3600000003 HC OR TIME - INITIAL BASE CHARGE - PROCEDURE LEVEL THREE: Performed by: ORTHOPAEDIC SURGERY

## 2025-05-14 PROCEDURE — 2500000001 HC RX 250 WO HCPCS SELF ADMINISTERED DRUGS (ALT 637 FOR MEDICARE OP): Performed by: ANESTHESIOLOGY

## 2025-05-14 PROCEDURE — 7100000009 HC PHASE TWO TIME - INITIAL BASE CHARGE: Performed by: ORTHOPAEDIC SURGERY

## 2025-05-14 RX ORDER — BUPIVACAINE HYDROCHLORIDE 5 MG/ML
INJECTION, SOLUTION EPIDURAL; INTRACAUDAL; PERINEURAL AS NEEDED
Status: DISCONTINUED | OUTPATIENT
Start: 2025-05-14 | End: 2025-05-14 | Stop reason: HOSPADM

## 2025-05-14 RX ORDER — PROPOFOL 10 MG/ML
INJECTION, EMULSION INTRAVENOUS AS NEEDED
Status: DISCONTINUED | OUTPATIENT
Start: 2025-05-14 | End: 2025-05-14

## 2025-05-14 RX ORDER — ONDANSETRON HYDROCHLORIDE 2 MG/ML
4 INJECTION, SOLUTION INTRAVENOUS ONCE AS NEEDED
Status: DISCONTINUED | OUTPATIENT
Start: 2025-05-14 | End: 2025-05-14 | Stop reason: HOSPADM

## 2025-05-14 RX ORDER — FLUCONAZOLE 150 MG/1
150 TABLET ORAL ONCE
Qty: 1 TABLET | Refills: 1 | Status: SHIPPED | OUTPATIENT
Start: 2025-05-14 | End: 2025-05-14

## 2025-05-14 RX ORDER — DROPERIDOL 2.5 MG/ML
0.62 INJECTION, SOLUTION INTRAMUSCULAR; INTRAVENOUS ONCE AS NEEDED
Status: DISCONTINUED | OUTPATIENT
Start: 2025-05-14 | End: 2025-05-14 | Stop reason: HOSPADM

## 2025-05-14 RX ORDER — OXYCODONE HYDROCHLORIDE 5 MG/1
5 TABLET ORAL EVERY 6 HOURS PRN
Qty: 12 TABLET | Refills: 0 | Status: SHIPPED | OUTPATIENT
Start: 2025-05-14

## 2025-05-14 RX ORDER — FENTANYL CITRATE 50 UG/ML
INJECTION, SOLUTION INTRAMUSCULAR; INTRAVENOUS AS NEEDED
Status: DISCONTINUED | OUTPATIENT
Start: 2025-05-14 | End: 2025-05-14

## 2025-05-14 RX ORDER — SODIUM CHLORIDE, SODIUM LACTATE, POTASSIUM CHLORIDE, CALCIUM CHLORIDE 600; 310; 30; 20 MG/100ML; MG/100ML; MG/100ML; MG/100ML
100 INJECTION, SOLUTION INTRAVENOUS CONTINUOUS
Status: DISCONTINUED | OUTPATIENT
Start: 2025-05-14 | End: 2025-05-14 | Stop reason: HOSPADM

## 2025-05-14 RX ORDER — SODIUM CHLORIDE, SODIUM LACTATE, POTASSIUM CHLORIDE, CALCIUM CHLORIDE 600; 310; 30; 20 MG/100ML; MG/100ML; MG/100ML; MG/100ML
20 INJECTION, SOLUTION INTRAVENOUS CONTINUOUS
Status: DISCONTINUED | OUTPATIENT
Start: 2025-05-14 | End: 2025-05-14 | Stop reason: HOSPADM

## 2025-05-14 RX ORDER — MEPERIDINE HYDROCHLORIDE 25 MG/ML
12.5 INJECTION INTRAMUSCULAR; INTRAVENOUS; SUBCUTANEOUS EVERY 10 MIN PRN
Status: DISCONTINUED | OUTPATIENT
Start: 2025-05-14 | End: 2025-05-14 | Stop reason: HOSPADM

## 2025-05-14 RX ORDER — DEXMEDETOMIDINE IN 0.9 % NACL 20 MCG/5ML
SYRINGE (ML) INTRAVENOUS AS NEEDED
Status: DISCONTINUED | OUTPATIENT
Start: 2025-05-14 | End: 2025-05-14

## 2025-05-14 RX ORDER — ALBUTEROL SULFATE 0.83 MG/ML
2.5 SOLUTION RESPIRATORY (INHALATION) ONCE AS NEEDED
Status: DISCONTINUED | OUTPATIENT
Start: 2025-05-14 | End: 2025-05-14 | Stop reason: HOSPADM

## 2025-05-14 RX ORDER — DIPHENHYDRAMINE HYDROCHLORIDE 50 MG/ML
12.5 INJECTION, SOLUTION INTRAMUSCULAR; INTRAVENOUS ONCE AS NEEDED
Status: DISCONTINUED | OUTPATIENT
Start: 2025-05-14 | End: 2025-05-14 | Stop reason: HOSPADM

## 2025-05-14 RX ORDER — LIDOCAINE HCL/PF 100 MG/5ML
SYRINGE (ML) INTRAVENOUS AS NEEDED
Status: DISCONTINUED | OUTPATIENT
Start: 2025-05-14 | End: 2025-05-14

## 2025-05-14 RX ORDER — GLYCOPYRROLATE 0.2 MG/ML
INJECTION INTRAMUSCULAR; INTRAVENOUS AS NEEDED
Status: DISCONTINUED | OUTPATIENT
Start: 2025-05-14 | End: 2025-05-14

## 2025-05-14 RX ORDER — KETOROLAC TROMETHAMINE 30 MG/ML
INJECTION, SOLUTION INTRAMUSCULAR; INTRAVENOUS AS NEEDED
Status: DISCONTINUED | OUTPATIENT
Start: 2025-05-14 | End: 2025-05-14

## 2025-05-14 RX ORDER — MIDAZOLAM HYDROCHLORIDE 1 MG/ML
INJECTION, SOLUTION INTRAMUSCULAR; INTRAVENOUS AS NEEDED
Status: DISCONTINUED | OUTPATIENT
Start: 2025-05-14 | End: 2025-05-14

## 2025-05-14 RX ORDER — OXYCODONE HYDROCHLORIDE 5 MG/1
5 TABLET ORAL EVERY 4 HOURS PRN
Status: DISCONTINUED | OUTPATIENT
Start: 2025-05-14 | End: 2025-05-14 | Stop reason: HOSPADM

## 2025-05-14 RX ORDER — CEFAZOLIN 1 G/1
INJECTION, POWDER, FOR SOLUTION INTRAVENOUS AS NEEDED
Status: DISCONTINUED | OUTPATIENT
Start: 2025-05-14 | End: 2025-05-14

## 2025-05-14 RX ORDER — ONDANSETRON HYDROCHLORIDE 2 MG/ML
INJECTION, SOLUTION INTRAVENOUS AS NEEDED
Status: DISCONTINUED | OUTPATIENT
Start: 2025-05-14 | End: 2025-05-14

## 2025-05-14 RX ADMIN — ONDANSETRON 4 MG: 2 INJECTION, SOLUTION INTRAMUSCULAR; INTRAVENOUS at 14:16

## 2025-05-14 RX ADMIN — PROPOFOL 150 MG: 10 INJECTION, EMULSION INTRAVENOUS at 13:21

## 2025-05-14 RX ADMIN — LIDOCAINE HYDROCHLORIDE 50 MG: 20 INJECTION INTRAVENOUS at 13:21

## 2025-05-14 RX ADMIN — OXYCODONE HYDROCHLORIDE 5 MG: 5 TABLET ORAL at 15:17

## 2025-05-14 RX ADMIN — FENTANYL CITRATE 50 MCG: 50 INJECTION, SOLUTION INTRAMUSCULAR; INTRAVENOUS at 13:35

## 2025-05-14 RX ADMIN — DEXAMETHASONE SODIUM PHOSPHATE 4 MG: 4 INJECTION INTRA-ARTICULAR; INTRALESIONAL; INTRAMUSCULAR; INTRAVENOUS; SOFT TISSUE at 14:16

## 2025-05-14 RX ADMIN — MIDAZOLAM 2 MG: 1 INJECTION INTRAMUSCULAR; INTRAVENOUS at 13:19

## 2025-05-14 RX ADMIN — PROPOFOL 50 MG: 10 INJECTION, EMULSION INTRAVENOUS at 14:28

## 2025-05-14 RX ADMIN — KETOROLAC TROMETHAMINE 30 MG: 30 INJECTION, SOLUTION INTRAMUSCULAR at 14:16

## 2025-05-14 RX ADMIN — Medication 8 MCG: at 13:21

## 2025-05-14 RX ADMIN — FENTANYL CITRATE 50 MCG: 50 INJECTION, SOLUTION INTRAMUSCULAR; INTRAVENOUS at 13:30

## 2025-05-14 RX ADMIN — SODIUM CHLORIDE, SODIUM LACTATE, POTASSIUM CHLORIDE, AND CALCIUM CHLORIDE 20 ML/HR: .6; .31; .03; .02 INJECTION, SOLUTION INTRAVENOUS at 11:52

## 2025-05-14 RX ADMIN — CEFAZOLIN 2 G: 1 INJECTION, POWDER, FOR SOLUTION INTRAMUSCULAR; INTRAVENOUS at 13:29

## 2025-05-14 RX ADMIN — GLYCOPYRROLATE 0.2 MG: 0.2 INJECTION, SOLUTION INTRAMUSCULAR; INTRAVENOUS at 13:39

## 2025-05-14 SDOH — HEALTH STABILITY: MENTAL HEALTH: CURRENT SMOKER: 0

## 2025-05-14 ASSESSMENT — PAIN - FUNCTIONAL ASSESSMENT
PAIN_FUNCTIONAL_ASSESSMENT: 0-10
PAIN_FUNCTIONAL_ASSESSMENT: 0-10

## 2025-05-14 ASSESSMENT — PAIN DESCRIPTION - DESCRIPTORS
DESCRIPTORS: ACHING
DESCRIPTORS: ACHING

## 2025-05-14 ASSESSMENT — PAIN SCALES - GENERAL
PAINLEVEL_OUTOF10: 0 - NO PAIN

## 2025-05-14 ASSESSMENT — PAIN DESCRIPTION - ORIENTATION: ORIENTATION: LEFT

## 2025-05-14 ASSESSMENT — PAIN DESCRIPTION - LOCATION: LOCATION: HAND

## 2025-05-14 NOTE — DISCHARGE INSTRUCTIONS
Dr. Ruiz Post-Operative Instructions  Hand/Wrist/Elbow    Activity:  Rest on the day of surgery.  Gradually resume your regular diet, beginning with clear liquids and progressing as you feel ready.  No driving if you had anesthesia.    Anesthesia:  You may feel dizzy, sleepy or lightheaded for up to 24 hours after surgery.  If you had general anesthesia you may have a sore throat for 1-2 days.  If you had a nerve block wear a sling until nerve function returns for your safety.  Nerve block may last 18-36 hours.    Post-Operative Medications:  You may resume your regular medications (including blood thinners if you stopped them).  You have been given a prescription for pain medication as needed.  You may also take over-the-counter anti-inflammatories and/or Tylenol for pain relief.  If you are taking the prescribed pain medication you must limit additional Tylenol (acetaminophen) intake to avoid overdose.    Dressings:  Keep your splint clean and dry.  You may cover with a plastic bag or cast cover and seal bag to your skin above the bandage for showering.  If your dressing becomes wet or significantly bloodstained call the office at [phone].    Post-Operative Care:  Keep the surgical site elevated above the level of your heart to limit swelling.  If your fingers are not included in the dressing you are encouraged to move your fingers regularly (full fist and full extension).  Regularly ice the surgical site.  You may also apply ice pack above the level of the dressing and this will cool the blood as it travels towards the surgical site.    Call Surgeon/Office at any time for:           Office Number: [phone]  Excessive bleeding  Loss of feeling (The local numbing medicine from surgery typically lasts 8-12 hours.  Nerve blocks can last 18-36 hours.)  Tight dressing: Make sure that you are elevating the operative site appropriately.  If no relief then call the office.                                                                                                         Circulation issues:  If fingers change to white or blue  Concerns/Problems with your surgery

## 2025-05-14 NOTE — ANESTHESIA PREPROCEDURE EVALUATION
Patient: Catarina Marquez    Procedure Information       Date/Time: 05/14/25 1205    Procedure: LEFT THUMB FLEXOR TENDON REPAIR (Left: Thumb)    Location: [facility] / [facility]    Surgeons: Sumanth Ruiz MD          Vitals:    05/14/25 1119   BP: 126/56   Pulse: 64   Resp: 16   Temp: 36.3 °C (97.3 °F)   SpO2: 98%       Surgical History[1]  Medical History[2]  Current Medications[3]  Prior to Admission medications    Medication Sig Start Date End Date Taking? Authorizing Provider   clobetasoL 0.05 % lotion Apply topically 2 times a day as needed.  Apply topically to affected area    Historical Provider, MD   clonazePAM (KlonoPIN) 0.5 mg tablet Take by mouth. As directed.    Historical Provider, MD   docusate sodium (Colace) 100 mg capsule Take 1 capsule (100 mg) by mouth twice a day. 7/15/19   Historical Provider, MD   EPINEPHrine (Epipen) 0.3 mg/0.3 mL injection syringe Inject 0.3 mL (0.3 mg) into the muscle 1 time for 1 dose. use as directed for allergic reaction and then call 911 10/14/24 10/14/24  Ramona Ku MD   fluconazole (Diflucan) 150 mg tablet Take 1 tablet (150 mg) by mouth 1 time. 6/25/21   Historical Provider, MD   ibuprofen (IBU) 600 mg tablet Take 1 tablet (600 mg) by mouth every 6 hours if needed for mild pain (1 - 3). 7/15/19   Historical Provider, MD   levonorgestrel (Mirena) 21 mcg/24 hours (8 yrs) 52 mg IUD by intrauterine route. As directed.  Inserted June 2019.  Due out June 2027 6/25/19   Historical Provider, MD   sertraline (Zoloft) 50 mg tablet Take 1 tablet (50 mg) by mouth once daily. 2/14/19   Historical Provider, MD   sodium chloride (Ocean) 0.65 % nasal spray Administer 2 sprays into each nostril 4 times a day as needed.    Historical Provider, MD   sulfamethoxazole-trimethoprim (Bactrim DS) 800-160 mg tablet Take 1 tablet by mouth 2 times a day for 7 days. 5/8/25 5/15/25  Sumanth B Macknin, MD     RX Allergies[4]  Social History     Tobacco Use   • Smoking status: Never  "  • Smokeless tobacco: Never   Substance Use Topics   • Alcohol use: Not Currently         Chemistry    Lab Results   Component Value Date/Time     07/01/2019 1031    K 4.0 07/01/2019 1031     07/01/2019 1031    CO2 26 07/01/2019 1031    BUN 15 07/01/2019 1031    CREATININE 0.63 07/01/2019 1031    Lab Results   Component Value Date/Time    CALCIUM 9.1 07/01/2019 1031          Lab Results   Component Value Date/Time    WBC 6.4 07/01/2019 1031    HGB 13.0 07/01/2019 1031    HCT 38.5 07/01/2019 1031     07/01/2019 1031     No results found for: \"PROTIME\", \"PTT\", \"INR\"  No results found for this or any previous visit (from the past 4464 hours).  No results found for this or any previous visit from the past 1095 days.      Relevant Problems   Cardiac   (+) Chest pain      Pulmonary   (+) Asthma      Neuro   (+) Anxiety   (+) Lumbar disc herniation with radiculopathy   (+) Lumbosacral neuritis   (+) Sciatica      Musculoskeletal   (+) Lumbar disc herniation with radiculopathy   (+) Lumbar stenosis with neurogenic claudication      Skin   (+) Eczema       Clinical information reviewed:       Med Hx             NPO Detail:  No data recorded     Physical Exam    Airway  Mallampati: II     Cardiovascular - normal exam   Dental    Pulmonary - normal exam   Abdominal          Anesthesia Plan    History of general anesthesia?: yes  History of complications of general anesthesia?: no    ASA 2     general     The patient is not a current smoker.  Patient was not previously instructed to abstain from smoking on day of procedure.  Patient did not smoke on day of procedure.  Education provided regarding risk of obstructive sleep apnea.  intravenous induction   Anesthetic plan and risks discussed with patient.    Plan discussed with CRNA.           [1]  Past Surgical History:  Procedure Laterality Date   • BACK SURGERY  06/20/2016    Lower Back Surgery   • OTHER SURGICAL HISTORY  08/27/2019    Umbilical hernia " repair   [2]  Past Medical History:  Diagnosis Date   • Acute upper respiratory infection, unspecified 05/25/2017   • Generalized anxiety disorder    • History of migraine headaches    • Thumb laceration, left, initial encounter 05/02/2025   • Umbilical hernia without obstruction or gangrene 06/11/2019   [3]  No current facility-administered medications for this encounter.  [4]  Allergies  Allergen Reactions   • Neomycin Swelling

## 2025-05-14 NOTE — ANESTHESIA PROCEDURE NOTES
Airway  Date/Time: 5/14/2025 1:23 PM  Reason: elective    Airway not difficult    Staffing  Performed: CRNA   Authorized by: Tomás Chan MD    Performed by: JANAE Gold-LUIS MIGUEL  Patient location during procedure: OR    Patient Condition  Indications for airway management: anesthesia  Sedation level: deep     Final Airway Details   Preoxygenated: yes  Final airway type: supraglottic airway  Successful airway: Supraglottic airway: iGel.  Size: 3  Number of attempts at approach: 1

## 2025-05-14 NOTE — ANESTHESIA POSTPROCEDURE EVALUATION
Patient: Catarina Marquez    Procedure Summary       Date: 05/14/25 Room / Location: [facility] OR [facility] / [facility]    Anesthesia Start: 1319 Anesthesia Stop: 1443    Procedure: LEFT THUMB FLEXOR TENDON REPAIR (Left: Thumb) Diagnosis:       Thumb laceration, left, initial encounter      (Thumb laceration, left, initial encounter [S61.012A])    Surgeons: Sumanth Ruiz MD Responsible Provider: Tomás Chan MD    Anesthesia Type: general ASA Status: 2            Anesthesia Type: general    Vitals Value Taken Time   /65 05/14/25 14:41   Temp 36.3 °C (97.3 °F) 05/14/25 14:41   Pulse 69 05/14/25 14:41   Resp 15 05/14/25 14:41   SpO2 98 % 05/14/25 14:41       Anesthesia Post Evaluation    Patient location during evaluation: PACU  Patient participation: complete - patient participated  Level of consciousness: awake  Pain management: adequate  Multimodal analgesia pain management approach  Airway patency: patent  Two or more strategies used to mitigate risk of obstructive sleep apnea  Cardiovascular status: acceptable  Respiratory status: acceptable  Hydration status: acceptable  Postoperative Nausea and Vomiting: none        No notable events documented.

## 2025-05-14 NOTE — OP NOTE
Pre-Operative Diagnosis: Left thumb zone 2 FPL tendon laceration  Post-Operative Diagnosis: same  Procedure: Left thumb zone 2 FPL tendon repair  Surgeon: Sara  Assistant: None  Anesthesia: General  Complications: none  Estimated blood loss: Minimal  Specimen: 1 swab for culture  Implants:  Nothing was implanted during the procedure  Findings: See below  Disposition: Good/PACU      Indications: Patient sustained laceration to her left thumb and immediate inability to flex at PIP joint.  She was seen in clinic and there was a small superficial abscess that was drained and she was started on oral antibiotics.  On the day of her presentation for surgery the previous infection appeared to be fully cleared.  We discussed risks and benefits of nonoperative versus operative treatment options and after thoroughly reviewing patient was proceed with surgical intervention, as indicated.  Expected operative and postoperative courses reviewed and questions addressed.    Operative course: Patient was greeted in the preoperative holding area and the operative site was marked with indelible marker.  Patient was brought back to the operating room suite where general anesthetic was induced by the anesthesia team.  Left upper extremities prepped and draped in standard sterile fashion timeout procedure was performed as per standard protocol.  Esmarch was used to exsanguinate left upper extremity and upper arm tourniquet was inflated.  The previous laceration was gently spread open and it was confirmed that the FPL tendon was fully lacerated.  A swab of the superficial portion of this wound was taken and sent for culture analysis.  There was no gross infection appreciated on exam.  Candi type incision was made extending proximally and distally to allow for better visualization of the distal tendon stump.  The proximal FPL tendon had retracted proximally and an additional incision was made around the thumb A1 pulley.  Gentle  spreading was carried down through the subcutaneous tissues.  The radial ulnar nerves were identified and protected with gentle blunt retraction.  The FPL tendon was identified at this level and able to be gently passed in a proximal to distal direction until it was fully visible at the distal wound.  A 21-gauge needle was inserted into the tendon and skin to allow for the tendon to remain in the appropriate positioning.  An epitendinous repair of the FPL tendon was then performed with 5-0 Prolene along the dorsal surface of the tendon.  Following this a 4 core cruciate repair with 3-0 Ethibond was performed of the FPL tendon.  After completing this repair the epitendinous repair was continued along the anterior portion of the FPL tendon.  Excellent tendon apposition was able to be achieved.  Normal tenodesis of the thumb was confirmed and gentle pressure along the proximal FPL tendon at the level of the A1 pulley confirmed no points of constriction and smoothly moving tendon.  Wounds were copiously irrigated and reapproximated with 4-0 nylon suture.  Marcaine was infiltrated for postoperative analgesic relief and dry sterile dressings were applied followed by a dorsal blocking splint for the thumb with the wrist in approximately 30 degrees flexion.    Patient was awoken from anesthesia uneventfully taken the recovery for further care.  She will complete her course of oral antibiotics as previously prescribed and plan for therapy follow-up to transition to thermoplastic plant and begin early range of motion protocol.

## 2025-05-16 ENCOUNTER — EVALUATION (OUTPATIENT)
Dept: OCCUPATIONAL THERAPY | Facility: HOSPITAL | Age: 40
End: 2025-05-16
Payer: COMMERCIAL

## 2025-05-16 DIAGNOSIS — S61.012A THUMB LACERATION, LEFT, INITIAL ENCOUNTER: Primary | ICD-10-CM

## 2025-05-16 PROCEDURE — L3906 WHO W/O JOINTS CF: HCPCS | Performed by: OCCUPATIONAL THERAPIST

## 2025-05-16 PROCEDURE — 97110 THERAPEUTIC EXERCISES: CPT | Mod: GO | Performed by: OCCUPATIONAL THERAPIST

## 2025-05-16 PROCEDURE — 97165 OT EVAL LOW COMPLEX 30 MIN: CPT | Mod: GO | Performed by: OCCUPATIONAL THERAPIST

## 2025-05-16 ASSESSMENT — ENCOUNTER SYMPTOMS
LOSS OF SENSATION IN FEET: 0
DEPRESSION: 0
OCCASIONAL FEELINGS OF UNSTEADINESS: 0

## 2025-05-16 ASSESSMENT — PAIN - FUNCTIONAL ASSESSMENT: PAIN_FUNCTIONAL_ASSESSMENT: 0-10

## 2025-05-16 ASSESSMENT — PAIN SCALES - GENERAL: PAINLEVEL_OUTOF10: 5 - MODERATE PAIN

## 2025-05-16 NOTE — PROGRESS NOTES
"    Occupational Therapy  Occupational Therapy Orthopedic Evaluation    Patient Name: Catarina Marquez  MRN: [id number]  Today's Date: 5/16/2025  Time Calculation  Start Time: 1030  Stop Time: 1115  Time Calculation (min): 45 min    Insurance:    Time:  Time Calculation  Start Time: 1030  Stop Time: 1115  Time Calculation (min): 45 min  OT Evaluation Time Entry  OT Evaluation (Low) Time Entry: 20  OT Therapeutic Procedures Time Entry  Therapeutic Exercise Time Entry: 10    Insurance:  Visit number: 1 of 24  Authorization info: no authorization is needed   Insurance Type: Payor: [employer] / Plan: [employer] HEALTH PLAN / Product Type: *No Product type* /     General:  Reason for visit: s/p L thumb FPL repair   Referred by: Chi    Current Problem  1. Thumb laceration, left, initial encounter  Referral to Occupational Therapy    Follow Up In Occupational Therapy          Precautions: Flexor tendon protocol.         Medical History Form: Reviewed (scanned into chart)    Subjective:   Chief Complaint: \"I can't use it.\"  Onset: Patient reports cutting her L thumb on a  blade on 5/2/25 resulting in a FPL laceration at zone 2. Patient also had an infection with an abscess forming. Patient had SX repair on 5/14/25 involving FPL repair.   DAVIDSON: Insidious   DOI/DOS: DOI=5/2/25, DOS=5/14/25      Hand Dominance: Left    Current Condition since injury:   same     PAIN  Pain Assessment: 0-10  0-10 (Numeric) Pain Score: 5 - Moderate pain  Pain Type: Acute pain, Surgical pain  Pain Location: Hand  Pain Orientation: Left  Pain Radiating Towards: wrist  Aggravating Factors: Finger/Thumb Flexion and Finger/Thumb extension   Relieving Factors: Rest     Relevant Information (PMH & Previous Tests/Imaging): No significant PMH  Previous Interventions/Treatments: None     Prior Level of Function (PLOF)  Exercise/Physical Activity: Patient reports being independent with all ADL's prior to injury. Patient enjoys participating in [email or web address] " "programs.   Work/School: Patient is employed as an . Patient is currently working  Current ADL/IADL Status: All ADL's involving L hand are impaired.     Patients Living Environment: Reviewed and no concern. Patient lives with her  and 4 young children aged 6-13.    Primary Language: English    There are no spiritual/cultural practices/values/needs that are important to know      Pt goals for therapy: \"Normal use\" of L hand/wrist     Red Flags: Do you have any of the following? No  Fever/chills, unexplained weight changes, dizziness/fainting, unexplained change in bowel or bladder functions, unexplained malaise or muscle weakness, night pain/sweats, numbness or tingling    Objective:    Evaluation Complexity=Low  Rehab Prognosis=Good     RIGHT HAND AROM (Degrees)   MCP PIP DIP HERNANDEZ DPC   IF      0   MF     0   RF     0   SF     0   Thumb WFL  WFL     Thumb RABD WFL       Thumb PABD WFL         LEFT HAND AROM (Degrees)   MCP PIP DIP HERNANDEZ DPC   IF      IMP   MF     IMP   RF     IMP   SF     IMP   Thumb IMP  IMP     Thumb RABD IMP       Thumb PABD IMP         General stiffness is noted with AROM and PROM of L thumb.    Physical Observation: 6 intact incision is noted along volar surface of thumb   Edema: mild throughout circumferential L thumb.     Sensory: hypersensitivity is noted at surgical site.   Numbness/Tingling: none      Outcome Measures:  UEFI: 0/80    EDUCATION: home exercise program, plan of care, activity modifications, pain management, and injury pathology       Goals:  Active       OT Goals       Patient is independent with entire HEP including splint use.        Start:  05/16/25    Expected End:  06/15/25            Patient c/o 2/10 or less pain in L hand/wrist with use during ADL's and home exercises.        Start:  05/16/25    Expected End:  06/15/25            PROM: no lags of flexion of L thumb to base of SF at DPC.        Start:  05/16/25    Expected End:  06/15/25            L " distal UE AROM is sufficient for independent completion of all ADL's and advanced ADL's.        Start:  05/16/25    Expected End:  08/14/25            L distal UE strength is sufficient for independent completion of all ADL's and advanced ADL's.        Start:  05/16/25    Expected End:  08/14/25                Plan of care was developed with input and agreement by the patient    Treatments:     Therapeutic Exercise:   10 min  Therapeutic Exercise  Therapeutic Exercise Performed: Yes  Therapeutic Exercise Activity 1: PROM with thumb flexion and AROM of thumb extension within restrictions of splint x 10 reps each (added to HEP)    Orthosis:      15 min  FA based dorsal block was fabricated an applied with wrist neutral and flexion of CMC, MP, and IP  joint so L thumb.     Wound Care:       SX dressing was removed. Wound was cleansed, dried, and light redressed.     Assessment: Patient is a 40 yo female  s/p FPL repair of L thumb  resulting in limited participation in pain-free ADLs and inability to perform at their prior level of function. Pt would benefit from occupational therapy to address the impairments found & listed previously in the objective section in order to return to safe and pain-free ADLs and prior level of function.       Clinical Presentation: Stable and/or uncomplicated characteristics       Plan:      Planned Interventions include: therapeutic exercise, therapeutic activity, self-care home management, manual therapy, therapeutic activities, gait training, neuromuscular coordination, vasopneumatic, dry needling, aquatic therapy, electric stimulation, fluidotherapy, ultrasound, kinesiotaping, orthosis fabrication, wound care  Frequency: 1-2 x Week  Duration: 12 Weeks  Rehab potential/prognosis: Nikhil Styles, OT

## 2025-05-17 LAB
BACTERIA SPEC CULT: ABNORMAL
GRAM STN SPEC: ABNORMAL
GRAM STN SPEC: ABNORMAL

## 2025-05-19 ENCOUNTER — TREATMENT (OUTPATIENT)
Dept: OCCUPATIONAL THERAPY | Facility: HOSPITAL | Age: 40
End: 2025-05-19
Payer: COMMERCIAL

## 2025-05-19 DIAGNOSIS — S61.012A THUMB LACERATION, LEFT, INITIAL ENCOUNTER: ICD-10-CM

## 2025-05-19 PROCEDURE — 97110 THERAPEUTIC EXERCISES: CPT | Mod: GO | Performed by: OCCUPATIONAL THERAPIST

## 2025-05-19 ASSESSMENT — PAIN - FUNCTIONAL ASSESSMENT: PAIN_FUNCTIONAL_ASSESSMENT: 0-10

## 2025-05-19 ASSESSMENT — PAIN SCALES - GENERAL: PAINLEVEL_OUTOF10: 2

## 2025-05-19 NOTE — PROGRESS NOTES
"    Occupational Therapy  Occupational Therapy Treatment    Patient Name: Catarina Marquez  MRN: [id number]  Today's Date: 5/19/2025  Time Calculation  Start Time: 1500  Stop Time: 1530  Time Calculation (min): 30 min        Time:  Time Calculation  Start Time: 1500  Stop Time: 1530  Time Calculation (min): 30 min  OT Therapeutic Procedures Time Entry  Therapeutic Exercise Time Entry: 30    Insurance:  Visit number: 2 of 24  Authorization info: no authorization   Insurance Type: Payor: [employer] / Plan: [employer] HEALTH PLAN / Product Type: *No Product type* /    General:  Reason for visit: L thumb FPL repair  Referred by: Chi     Current Problem  1. Thumb laceration, left, initial encounter  Follow Up In Occupational Therapy          Precautions   4 weeks=unrestricted AROM      Subjective:   Patient reports \"This brace is so much better.\"    Performing HEP?: Yes    Pain  Pain Assessment: 0-10  0-10 (Numeric) Pain Score: 2  Pain Type: Acute pain  Pain Location: Hand  Pain Orientation: Left  Pain Radiating Towards: wrist and thumb  Post-tx pain=0/10    Objective:     PROM: L thumb flexion to base of SF at DPC following exercises.     Physical Observation: intact sutures with no active drainage  Edema: mild in circumferential thumb     Sensory: intact   Numbness/Tingling: none    Treatments:     Modalities:        Modalities  Modalities Used: Yes  Modality 1: Untimed Hotpack with flexion promotion of thumb     Therapeutic Exercise:   30 min  Therapeutic Exercise  Therapeutic Exercise Performed: Yes  Therapeutic Exercise Activity 1: PROM with thumb flexion and AROM with extension to restrictions of splint x 10 reps  Therapeutic Exercise Activity 2: digits 2-5 full flexion to extension x 10 reps each    Orthosis:        No areas of skin irritation are noted with splint use.     Wound Care:       Dressing was removed and wound was cleansed and lightly redressed    Assessment: Improvement is noted with passive flexion of L " thumb following tx this afternoon.        Plan: continue with plan of care per protocol.        Misa Styles, OT

## 2025-05-20 ENCOUNTER — APPOINTMENT (OUTPATIENT)
Dept: OCCUPATIONAL THERAPY | Facility: HOSPITAL | Age: 40
End: 2025-05-20
Payer: COMMERCIAL

## 2025-05-27 ENCOUNTER — TREATMENT (OUTPATIENT)
Dept: OCCUPATIONAL THERAPY | Facility: HOSPITAL | Age: 40
End: 2025-05-27
Payer: COMMERCIAL

## 2025-05-27 ENCOUNTER — APPOINTMENT (OUTPATIENT)
Dept: ORTHOPEDIC SURGERY | Facility: CLINIC | Age: 40
End: 2025-05-27
Payer: COMMERCIAL

## 2025-05-27 DIAGNOSIS — S61.012A THUMB LACERATION, LEFT, INITIAL ENCOUNTER: ICD-10-CM

## 2025-05-27 DIAGNOSIS — S61.012A THUMB LACERATION, LEFT, INITIAL ENCOUNTER: Primary | ICD-10-CM

## 2025-05-27 PROCEDURE — 99024 POSTOP FOLLOW-UP VISIT: CPT | Performed by: ORTHOPAEDIC SURGERY

## 2025-05-27 PROCEDURE — 97140 MANUAL THERAPY 1/> REGIONS: CPT | Mod: GO | Performed by: OCCUPATIONAL THERAPIST

## 2025-05-27 PROCEDURE — 97110 THERAPEUTIC EXERCISES: CPT | Mod: GO | Performed by: OCCUPATIONAL THERAPIST

## 2025-05-27 PROCEDURE — 1036F TOBACCO NON-USER: CPT | Performed by: ORTHOPAEDIC SURGERY

## 2025-05-27 ASSESSMENT — PAIN - FUNCTIONAL ASSESSMENT
PAIN_FUNCTIONAL_ASSESSMENT: NO/DENIES PAIN
PAIN_FUNCTIONAL_ASSESSMENT: 0-10

## 2025-05-27 ASSESSMENT — PAIN SCALES - GENERAL: PAINLEVEL_OUTOF10: 0 - NO PAIN

## 2025-05-27 NOTE — PROGRESS NOTES
"    Occupational Therapy  Occupational Therapy Treatment    Patient Name: Catarina Marquez  MRN: [id number]  Today's Date: 5/27/2025  Time Calculation  Start Time: 0945  Stop Time: 1025  Time Calculation (min): 40 min    Time:  Time Calculation  Start Time: 0945  Stop Time: 1025  Time Calculation (min): 40 min  OT Therapeutic Procedures Time Entry  Manual Therapy Time Entry: 8  Therapeutic Exercise Time Entry: 32    Insurance:  Visit number: 2 of 24  Authorization info: no authorization   Insurance Type: Payor: [employer] / Plan: [employer] HEALTH PLAN / Product Type: *No Product type* /    General:  Reason for visit: L thumb laceration with FPL repair  Referred by: Chi     Current Problem  1. Thumb laceration, left, initial encounter  Follow Up In Occupational Therapy          Precautions   6 weeks=unrestricted AROM       Subjective:   Patient reports \"I got my stitches out and my skin is really dry.\"    Performing HEP?: Yes    Pain  Pain Assessment: 0-10  0-10 (Numeric) Pain Score: 0 - No pain  Post-tx pain=0/10    Objective:     PROM: L thumb flexion to base of SF     Physical Observation: skin is intact  Edema: mild in circumferential thumb     Sensory: intact   Numbness/Tingling: none    Treatments:     Modalities:        Modalities  Modalities Used: Yes  Modality 1: Untimed Hotpack with AROM promotion     Therapeutic Exercise:   32 min  Therapeutic Exercise  Therapeutic Exercise Performed: Yes  Therapeutic Exercise Activity 1: PROM with flexion and AROM with extension of L thumb within restrictions of splint  Therapeutic Exercise Activity 2: tenodesis x 10 reps (issued for HEP)  Therapeutic Exercise Activity 3: short arc thumb active flexion x 10 reps (issued for HEP)    Manual Therapy:     8 min  Manual Therapy  Manual Therapy Performed: Yes  Manual Therapy Activity 1: scar massage and soft tissue mobilization near SX area (issued for HEP)    Assessment: Patient tolerated tx advancement well.        Plan: Continue " with plan of care per SX protocol.        Misa Styles, OT

## 2025-05-27 NOTE — PROGRESS NOTES
History of Present Illness  Chief Complaint   Patient presents with    Left Hand - Post-op     5/14/25 left thumb flexor tendon repair       Pain controlled  Patient reports compliance with Thermoplast splint use.  She has therapy appointment scheduled for later today.     Examination  Left thumb:  Incision is well healing. No signs of infection.  Sutures removed uneventfully.  IP joint tenodesis intact.  Sensation grossly intact distally.  Capillary refill less than 2 seconds.     Assessment:  Patient status post left thumb FPL repair     Plan  Patient will begin scar tissue massage.  Continue mobilization under therapy guidance with continued full-time use of dorsal blocking splint when not doing exercises.  We discussed expected recovery course as well as likely peak reaction at 4 to 6 weeks postoperatively.  Follow-up in 4 weeks for clinical check.  Call sooner if any issues/concerns.

## 2025-06-02 ENCOUNTER — APPOINTMENT (OUTPATIENT)
Dept: OCCUPATIONAL THERAPY | Facility: HOSPITAL | Age: 40
End: 2025-06-02
Payer: COMMERCIAL

## 2025-06-06 ENCOUNTER — TREATMENT (OUTPATIENT)
Dept: OCCUPATIONAL THERAPY | Facility: HOSPITAL | Age: 40
End: 2025-06-06
Payer: COMMERCIAL

## 2025-06-06 DIAGNOSIS — S61.012A THUMB LACERATION, LEFT, INITIAL ENCOUNTER: ICD-10-CM

## 2025-06-06 PROCEDURE — 97140 MANUAL THERAPY 1/> REGIONS: CPT | Mod: GO | Performed by: OCCUPATIONAL THERAPIST

## 2025-06-06 PROCEDURE — 97110 THERAPEUTIC EXERCISES: CPT | Mod: GO | Performed by: OCCUPATIONAL THERAPIST

## 2025-06-06 ASSESSMENT — PAIN - FUNCTIONAL ASSESSMENT
PAIN_FUNCTIONAL_ASSESSMENT: 0-10
PAIN_FUNCTIONAL_ASSESSMENT: 0-10

## 2025-06-06 ASSESSMENT — PAIN SCALES - GENERAL
PAINLEVEL_OUTOF10: 0 - NO PAIN
PAINLEVEL_OUTOF10: 0 - NO PAIN

## 2025-06-06 NOTE — PROGRESS NOTES
"    Occupational Therapy  Occupational Therapy Treatment    Patient Name: Catarina Marquez  MRN: [id number]  Today's Date: 6/6/2025  Time Calculation  Start Time: 1030  Stop Time: 1115  Time Calculation (min): 45 min    Time:  Time Calculation  Start Time: 1030  Stop Time: 1115  Time Calculation (min): 45 min  OT Therapeutic Procedures Time Entry  Manual Therapy Time Entry: 8  Therapeutic Exercise Time Entry: 37    Insurance:  Visit number: 4 of 24  Authorization info: no authorization   Insurance Type: Payor: [employer] / Plan: [employer] HEALTH PLAN / Product Type: *No Product type* /    General:  Reason for visit: L Thumb laceration with FPL repair   Referred by: Chi     Current Problem  1. Thumb laceration, left, initial encounter  Follow Up In Occupational Therapy          Precautions   4 weeks=AROM       Subjective:   Patient reports \"It feels better to move it now.\"    Performing HEP?: Yes    Pain  Pain Assessment: 0-10  0-10 (Numeric) Pain Score: 0 - No pain  Post-tx pain=0/10    Objective:     AROM: full flexion to DPC's of #2-5 following tx.     Physical Observation: intact incision   Edema: mild in circumferential thumb base       Treatments:     Modalities:        Modalities  Modalities Used: Yes  Modality 1: Untimed Hotpack with ROM promotion     Therapeutic Exercise:   37 min  Therapeutic Exercise  Therapeutic Exercise Performed: Yes  Therapeutic Exercise Activity 1: tendodesis x 10 reps each  Therapeutic Exercise Activity 2: short arc AROM with flexion and extension of thumb and full AROM of all digits 2-5 with extension and flexion  Therapeutic Exercise Activity 3: PROM with flexion and AROM with extension of thumb to restrictions of splint    Manual Therapy:     8 min  Manual Therapy  Manual Therapy Performed: Yes  Manual Therapy Activity 1: scar massage and soft tissue mobilization    Assessment: Good tolerance with tx progression of tx.        Plan: continue with plan of care 1-2x/wk with advancement of " program per SX protocol.        Misa Styles, OT

## 2025-06-09 ENCOUNTER — TREATMENT (OUTPATIENT)
Dept: OCCUPATIONAL THERAPY | Facility: HOSPITAL | Age: 40
End: 2025-06-09
Payer: COMMERCIAL

## 2025-06-09 DIAGNOSIS — S61.012A THUMB LACERATION, LEFT, INITIAL ENCOUNTER: ICD-10-CM

## 2025-06-09 PROCEDURE — 97110 THERAPEUTIC EXERCISES: CPT | Mod: GO | Performed by: OCCUPATIONAL THERAPIST

## 2025-06-09 ASSESSMENT — PAIN - FUNCTIONAL ASSESSMENT: PAIN_FUNCTIONAL_ASSESSMENT: 0-10

## 2025-06-09 ASSESSMENT — PAIN SCALES - GENERAL: PAINLEVEL_OUTOF10: 0 - NO PAIN

## 2025-06-09 NOTE — PROGRESS NOTES
"    Occupational Therapy  Occupational Therapy Treatment    Patient Name: Catarina Marquez  MRN: [id number]  Today's Date: 6/9/2025  Time Calculation  Start Time: 1625  Stop Time: 1715  Time Calculation (min): 50 min        Time:  Time Calculation  Start Time: 1625  Stop Time: 1715  Time Calculation (min): 50 min  OT Therapeutic Procedures Time Entry  Manual Therapy Time Entry: 8  Therapeutic Exercise Time Entry: 42    Insurance:  Visit number: 5 of 24  Authorization info: no authorization is needed  Insurance Type: Payor: [employer] / Plan: [employer] HEALTH PLAN / Product Type: *No Product type* /    General:  Reason for visit: L thumb laceration of FPL with repair  Referred by: Chi     Current Problem  1. Thumb laceration, left, initial encounter  Follow Up In Occupational Therapy          Precautions   6 weeks=splint d/c      Subjective:   Patient reports \"I am ready to start moving it.\"    Performing HEP?: Yes    Pain  Pain Assessment: 0-10  0-10 (Numeric) Pain Score: 0 - No pain  Post-tx pain=0/10    Objective:     AROM: L thumb radial abduction=46, palmar abduction=60    Physical Observation: intact incision   Edema: no significant change     Sensory: intact   Numbness/Tingling: none    Treatments:     Modalities:        Modalities  Modalities Used: Yes  Modality 1: Untimed Fluidotherapy with AROM promotion     Therapeutic Exercise:   42 min  Therapeutic Exercise  Therapeutic Exercise Performed: Yes  Therapeutic Exercise Activity 1: thumb radial and palmar abduction x 10 reps (issued for HEP)  Therapeutic Exercise Activity 2: oppositon and thumb circumduction x 10 reps each (issued for HEP)  Therapeutic Exercise Activity 3: thumb composite and IP E/F x 10 reps each (issued for HEP)    Manual Therapy:     8  min  Manual Therapy  Manual Therapy Performed: Yes  Manual Therapy Activity 1: scar massage and soft tissue mobilization      Assessment: Good tolerance with AROM progression        Plan: continue with plan of " care per SX protocol.        Misa Styles, OT

## 2025-06-13 ENCOUNTER — TREATMENT (OUTPATIENT)
Dept: OCCUPATIONAL THERAPY | Facility: HOSPITAL | Age: 40
End: 2025-06-13
Payer: COMMERCIAL

## 2025-06-13 DIAGNOSIS — S61.012A THUMB LACERATION, LEFT, INITIAL ENCOUNTER: ICD-10-CM

## 2025-06-13 PROCEDURE — 97110 THERAPEUTIC EXERCISES: CPT | Mod: GO | Performed by: OCCUPATIONAL THERAPIST

## 2025-06-13 PROCEDURE — 97140 MANUAL THERAPY 1/> REGIONS: CPT | Mod: GO | Performed by: OCCUPATIONAL THERAPIST

## 2025-06-13 ASSESSMENT — PAIN SCALES - GENERAL: PAINLEVEL_OUTOF10: 0 - NO PAIN

## 2025-06-13 ASSESSMENT — PAIN - FUNCTIONAL ASSESSMENT: PAIN_FUNCTIONAL_ASSESSMENT: 0-10

## 2025-06-13 NOTE — PROGRESS NOTES
"    Occupational Therapy  Occupational Therapy Treatment    Patient Name: Catarina Marquez  MRN: [id number]  Today's Date: 6/13/2025  Time Calculation  Start Time: 1330  Stop Time: 1423  Time Calculation (min): 53 min      Time:  Time Calculation  Start Time: 1330  Stop Time: 1423  Time Calculation (min): 53 min  OT Therapeutic Procedures Time Entry  Therapeutic Exercise Time Entry: 45  Manual Therapy Time Entry: 8    Insurance:  Visit number: 6 of 24  Authorization info: no authorization is needed   Insurance Type: Payor: [employer] / Plan: [employer] HEALTH PLAN / Product Type: *No Product type* /    General:  Reason for visit: L thumb laceration with FPL repair   Referred by: Chi     Current Problem  1. Thumb laceration, left, initial encounter  Follow Up In Occupational Therapy          Precautions  8 weeks=strength initiation       Subjective:   Patient reports \"It feels so good to move it.\"    Performing HEP?: Yes    Pain  Pain Assessment: 0-10  0-10 (Numeric) Pain Score: 0 - No pain  Post-tx pain=0/10    Objective:     L thumb radial abduction=60; R thumb radial abduction=80    Physical Observation: intact   Edema: mild throughout circumferential thumb       Treatments:     Modalities:        Modalities  Modalities Used: Yes  Modality 1: Untimed Fluidotherapy with AROM promotion     Therapeutic Exercise:   45 min  Therapeutic Exercise  Therapeutic Exercise Performed: Yes  Therapeutic Exercise Activity 1: thumb radial and palmar abduction x 10 reps each  Therapeutic Exercise Activity 2: opposition and thumb circumduction x 10 reps each  Therapeutic Exercise Activity 3: thumb composite and IP E/F x 10 reps each  Therapeutic Exercise Activity 4: cloth piece manipulation x 5 reps  Therapeutic Exercise Activity 5: wine bottle stretch x 10 reps    Manual Therapy:     8 min  Manual Therapy  Manual Therapy Performed: Yes  Manual Therapy Activity 1: scar massage and soft tissue mobilization    Other Treatment:      Silicone " scar pad issued for HS use    Assessment: Improvement is noted with flexibility of L thumb        Plan: Continue with plan of care 1-2x/wk       Misa Styles, OT

## 2025-06-17 ENCOUNTER — APPOINTMENT (OUTPATIENT)
Dept: OCCUPATIONAL THERAPY | Facility: HOSPITAL | Age: 40
End: 2025-06-17
Payer: COMMERCIAL

## 2025-06-20 ENCOUNTER — APPOINTMENT (OUTPATIENT)
Dept: OCCUPATIONAL THERAPY | Facility: HOSPITAL | Age: 40
End: 2025-06-20
Payer: COMMERCIAL

## 2025-06-20 ENCOUNTER — TREATMENT (OUTPATIENT)
Dept: OCCUPATIONAL THERAPY | Facility: HOSPITAL | Age: 40
End: 2025-06-20
Payer: COMMERCIAL

## 2025-06-20 DIAGNOSIS — S61.012A THUMB LACERATION, LEFT, INITIAL ENCOUNTER: ICD-10-CM

## 2025-06-20 PROCEDURE — 97140 MANUAL THERAPY 1/> REGIONS: CPT | Mod: GO | Performed by: OCCUPATIONAL THERAPIST

## 2025-06-20 PROCEDURE — 97110 THERAPEUTIC EXERCISES: CPT | Mod: GO | Performed by: OCCUPATIONAL THERAPIST

## 2025-06-20 ASSESSMENT — PAIN SCALES - GENERAL: PAINLEVEL_OUTOF10: 1

## 2025-06-20 ASSESSMENT — PAIN - FUNCTIONAL ASSESSMENT: PAIN_FUNCTIONAL_ASSESSMENT: 0-10

## 2025-06-20 NOTE — PROGRESS NOTES
"    Occupational Therapy  Occupational Therapy Treatment    Patient Name: Catarina Marquez  MRN: [id number]  Today's Date: 6/20/2025  Time Calculation  Start Time: 1330  Stop Time: 1415  Time Calculation (min): 45 min      Time:  Time Calculation  Start Time: 1330  Stop Time: 1415  Time Calculation (min): 45 min  OT Therapeutic Procedures Time Entry  Therapeutic Exercise Time Entry: 37  Manual Therapy Time Entry: 8    Insurance:  Visit number: 7 of 24  Authorization info: no authorization   Insurance Type: Payor: [employer] / Plan: [employer] HEALTH PLAN / Product Type: *No Product type* /    General:  Reason for visit: L thumb laceration and FPL repair  Referred by: Chi    Current Problem  1. Thumb laceration, left, initial encounter  Follow Up In Occupational Therapy          Precautions   8 weeks(7/9/25)=strengthening       Subjective:   Patient reports \"I am trying to use it more and it gets tired fast.\"    Performing HEP?: Yes    Pain  Pain Assessment: 0-10  0-10 (Numeric) Pain Score: 1  Pain Type: Chronic pain  Pain Location: Hand  Pain Orientation: Left  Pain Radiating Towards: wrist and thumb  Post-tx pain=0/10    Objective:     AROM: L radial abduction=62    Physical Observation: intact incision   Edema: mild circumferential thumb edema    Sensory: intact   Numbness/Tingling: none    Treatments:     Modalities:        Modalities  Modalities Used: Yes  Modality 1: Untimed Fluidotherapy with AROM promotion     Therapeutic Exercise:   37 min  Therapeutic Exercise  Therapeutic Exercise Performed: Yes  Therapeutic Exercise Activity 1: thumb radial and palmar abduction x 10 reps each  Therapeutic Exercise Activity 2: thumb opposition and circumduction x 10 reps each  Therapeutic Exercise Activity 3: thumb composite and IP E/F x 10 reps each  Therapeutic Exercise Activity 4: cloth piece manipulation x 5 reps  Therapeutic Exercise Activity 5: wine bottle stretch x 10 reps    Manual Therapy:     8 min  Manual " Therapy  Manual Therapy Performed: Yes    Assessment: slight improvement of L thumb radial abduction is noted this p.m. Emphasis was placed on promotion of IP E/F and scar management including use of rolling on small hard surfaced ball.        Plan: Continue with plan of care 1-2x/wk       Misa Styles OT

## 2025-06-23 ENCOUNTER — APPOINTMENT (OUTPATIENT)
Dept: OCCUPATIONAL THERAPY | Facility: HOSPITAL | Age: 40
End: 2025-06-23
Payer: COMMERCIAL

## 2025-07-01 ENCOUNTER — TREATMENT (OUTPATIENT)
Dept: OCCUPATIONAL THERAPY | Facility: HOSPITAL | Age: 40
End: 2025-07-01
Payer: COMMERCIAL

## 2025-07-01 DIAGNOSIS — S61.012A THUMB LACERATION, LEFT, INITIAL ENCOUNTER: Primary | ICD-10-CM

## 2025-07-01 PROCEDURE — 97140 MANUAL THERAPY 1/> REGIONS: CPT | Mod: GO | Performed by: OCCUPATIONAL THERAPIST

## 2025-07-01 PROCEDURE — 97110 THERAPEUTIC EXERCISES: CPT | Mod: GO | Performed by: OCCUPATIONAL THERAPIST

## 2025-07-01 ASSESSMENT — PAIN SCALES - GENERAL: PAINLEVEL_OUTOF10: 0 - NO PAIN

## 2025-07-01 ASSESSMENT — PAIN - FUNCTIONAL ASSESSMENT: PAIN_FUNCTIONAL_ASSESSMENT: 0-10

## 2025-07-01 NOTE — PROGRESS NOTES
"    Occupational Therapy  Occupational Therapy Treatment    Patient Name: Catarina Marquez  MRN: [id number]  Today's Date: 7/1/2025  Time Calculation  Start Time: 1330  Stop Time: 1415  Time Calculation (min): 45 min        Time:  Time Calculation  Start Time: 1330  Stop Time: 1415  Time Calculation (min): 45 min  OT Therapeutic Procedures Time Entry  Therapeutic Exercise Time Entry: 37  Manual Therapy Time Entry: 8    Insurance:  Visit number: 8 of 24  Authorization info: no authorization is needed  Insurance Type: Payor: [employer] / Plan: [employer] HEALTH PLAN / Product Type: *No Product type* /    General:  Reason for visit: L thumb laceration with FPL repair  Referred by: Chi    Current Problem  No diagnosis found.    Precautions   8 weeks(7/9/25)=strengthening       Subjective:   Patient reports \"I am definitely moving more.\"    Performing HEP?: Yes    Pain  Pain Assessment: 0-10  0-10 (Numeric) Pain Score: 0 - No pain  Post-tx pain=0/10    Objective:     AROM: L thumb IP E/F=0/37    Physical Observation: decreased edema  Edema: minimal circumferential edema is noted at base of L thumb.     Sensory: intact  Numbness/Tingling: none    Treatments:     Modalities:        Modalities  Modalities Used: Yes  Modality 1: Untimed Fluidotherapy with AROM promotion.     Therapeutic Exercise:   37 min  Therapeutic Exercise  Therapeutic Exercise Performed: Yes  Therapeutic Exercise Activity 1: Thumb AROM x 15 reps each  Therapeutic Exercise Activity 2: Thumb series x 10 reps each  Therapeutic Exercise Activity 3: Cloth piece manipulation x 5 reps  Therapeutic Exercise Activity 4: chines ball manipulation    Manual Therapy:     8 min  Manual Therapy  Manual Therapy Performed: Yes  Manual Therapy Activity 1: scar massage and soft tissue mobilization      Assessment: Improvement is noted with thumb IP flexion this p.m.        Plan: Continue with plan of care with initiation of strengthening next week        Misa Styles OT  "

## 2025-07-03 ENCOUNTER — APPOINTMENT (OUTPATIENT)
Dept: ORTHOPEDIC SURGERY | Facility: CLINIC | Age: 40
End: 2025-07-03
Payer: COMMERCIAL

## 2025-07-07 ENCOUNTER — TREATMENT (OUTPATIENT)
Dept: OCCUPATIONAL THERAPY | Facility: HOSPITAL | Age: 40
End: 2025-07-07
Payer: COMMERCIAL

## 2025-07-07 DIAGNOSIS — S61.012A THUMB LACERATION, LEFT, INITIAL ENCOUNTER: ICD-10-CM

## 2025-07-07 PROCEDURE — 97110 THERAPEUTIC EXERCISES: CPT | Mod: GO | Performed by: OCCUPATIONAL THERAPIST

## 2025-07-07 PROCEDURE — 97140 MANUAL THERAPY 1/> REGIONS: CPT | Mod: GO | Performed by: OCCUPATIONAL THERAPIST

## 2025-07-07 ASSESSMENT — PAIN - FUNCTIONAL ASSESSMENT: PAIN_FUNCTIONAL_ASSESSMENT: 0-10

## 2025-07-07 ASSESSMENT — PAIN SCALES - GENERAL: PAINLEVEL_OUTOF10: 0 - NO PAIN

## 2025-07-07 NOTE — PROGRESS NOTES
"    Occupational Therapy  Occupational Therapy Treatment    Patient Name: Catarina Marquez  MRN: [id number]  Today's Date: 7/7/2025  Time Calculation  Start Time: 1415  Stop Time: 1500  Time Calculation (min): 45 min      Time:  Time Calculation  Start Time: 1415  Stop Time: 1500  Time Calculation (min): 45 min  OT Therapeutic Procedures Time Entry  Therapeutic Exercise Time Entry: 37  Manual Therapy Time Entry: 8    Insurance:  Visit number: 9 of 24  Authorization info: no authorization is needed   Insurance Type: Payor: [employer] / Plan: [employer] HEALTH PLAN / Product Type: *No Product type* /    General:  Reason for visit: L thumb laceration with FPL repair   Referred by: Chi     Current Problem  1. Thumb laceration, left, initial encounter  Follow Up In Occupational Therapy          Precautions   none      Subjective:   Patient reports \"Everything is getting better.\"    Performing HEP?: Yes    Pain  Pain Assessment: 0-10  0-10 (Numeric) Pain Score: 0 - No pain  Post-tx pain=0/10    Objective:      strength #2 R/L=52/30#    Physical Observation: intact   Edema: none    Sensory: intact  Numbness/Tingling: none    Treatments:     Modalities:        Modalities  Modalities Used: Yes  Modality 1: Untimed Fluidotherapy with AROM promotion    Therapeutic Exercise:   37 min  Therapeutic Exercise  Therapeutic Exercise Performed: Yes  Therapeutic Exercise Activity 1: thumb AROM x 10 reps each including series  Therapeutic Exercise Activity 2: soft theraputty x 10 reps each with full , ext roll, ext ring, opposition, and key pinch (soft theraputty with 5 exercises issued for HEP)    Manual Therapy:     8 min  Manual Therapy  Manual Therapy Performed: Yes  Manual Therapy Activity 1: scar massage and soft tissue mobilization    Assessment: Patient tolerated strength initiation well.        Plan: continue with plan of care 1-2x/wk.        Misa Styles OT  "

## 2025-07-14 ENCOUNTER — APPOINTMENT (OUTPATIENT)
Dept: OCCUPATIONAL THERAPY | Facility: HOSPITAL | Age: 40
End: 2025-07-14
Payer: COMMERCIAL

## 2025-07-14 ENCOUNTER — DOCUMENTATION (OUTPATIENT)
Dept: OCCUPATIONAL THERAPY | Facility: HOSPITAL | Age: 40
End: 2025-07-14
Payer: COMMERCIAL

## 2025-07-17 ENCOUNTER — APPOINTMENT (OUTPATIENT)
Dept: ORTHOPEDIC SURGERY | Facility: CLINIC | Age: 40
End: 2025-07-17
Payer: COMMERCIAL

## 2025-07-17 DIAGNOSIS — S61.012A THUMB LACERATION, LEFT, INITIAL ENCOUNTER: Primary | ICD-10-CM

## 2025-07-17 PROCEDURE — 99024 POSTOP FOLLOW-UP VISIT: CPT | Performed by: ORTHOPAEDIC SURGERY

## 2025-07-17 PROCEDURE — 99212 OFFICE O/P EST SF 10 MIN: CPT | Performed by: ORTHOPAEDIC SURGERY

## 2025-07-17 ASSESSMENT — COLUMBIA-SUICIDE SEVERITY RATING SCALE - C-SSRS
1. IN THE PAST MONTH, HAVE YOU WISHED YOU WERE DEAD OR WISHED YOU COULD GO TO SLEEP AND NOT WAKE UP?: NO
6. HAVE YOU EVER DONE ANYTHING, STARTED TO DO ANYTHING, OR PREPARED TO DO ANYTHING TO END YOUR LIFE?: NO
2. HAVE YOU ACTUALLY HAD ANY THOUGHTS OF KILLING YOURSELF?: NO

## 2025-07-17 ASSESSMENT — PAIN - FUNCTIONAL ASSESSMENT: PAIN_FUNCTIONAL_ASSESSMENT: NO/DENIES PAIN

## 2025-07-17 ASSESSMENT — ENCOUNTER SYMPTOMS
OCCASIONAL FEELINGS OF UNSTEADINESS: 0
LOSS OF SENSATION IN FEET: 0
DEPRESSION: 0

## 2025-07-18 ENCOUNTER — APPOINTMENT (OUTPATIENT)
Dept: OCCUPATIONAL THERAPY | Facility: HOSPITAL | Age: 40
End: 2025-07-18
Payer: COMMERCIAL

## 2025-07-18 ENCOUNTER — DOCUMENTATION (OUTPATIENT)
Dept: OCCUPATIONAL THERAPY | Facility: HOSPITAL | Age: 40
End: 2025-07-18
Payer: COMMERCIAL

## 2025-07-18 NOTE — PROGRESS NOTES
Occupational Therapy                 Therapy Communication Note    Patient Name: Catarina Marquez  MRN: [id number]  Department:   Room: Room/bed info not found  Today's Date: 7/18/2025     Discipline: Occupational Therapy    Missed Visit Reason:  Patient cancelled via my chart. No reason provided.     Missed Time: Cancel

## 2025-07-19 NOTE — PROGRESS NOTES
History of Present Illness  Chief Complaint   Patient presents with    Left Hand - Post-op     5/14/25 left thumb flexor tendon repair     Splint has been discontinued as per therapy protocol.  She has noticed continued improvement in her motion.  She is still working with therapy in person once a week and regularly doing home exercises.     Examination  Left thumb:  Incision is well healing. No signs of infection.  Able to oppose tip of thumb to MCP flexion crease of small finger.  IP with 0-30 degrees active flexion.  Sensation grossly intact distally.  Capillary refill less than 2 seconds.     Assessment:  Patient status post left thumb FPL repair     Plan  Patient will continue scar tissue massage.  Continue mobilization and progression of activities.  We once again reviewed expected recovery course and questions addressed.  Tentative plan for follow-up in about 6 weeks for clinical check if any issues/concerns.

## 2025-07-21 ENCOUNTER — TREATMENT (OUTPATIENT)
Dept: OCCUPATIONAL THERAPY | Facility: HOSPITAL | Age: 40
End: 2025-07-21
Payer: COMMERCIAL

## 2025-07-21 DIAGNOSIS — S61.012A THUMB LACERATION, LEFT, INITIAL ENCOUNTER: ICD-10-CM

## 2025-07-21 PROCEDURE — 97110 THERAPEUTIC EXERCISES: CPT | Mod: GO | Performed by: OCCUPATIONAL THERAPIST

## 2025-07-21 ASSESSMENT — PAIN SCALES - GENERAL: PAINLEVEL_OUTOF10: 0 - NO PAIN

## 2025-07-21 ASSESSMENT — PAIN - FUNCTIONAL ASSESSMENT: PAIN_FUNCTIONAL_ASSESSMENT: 0-10

## 2025-07-21 NOTE — PROGRESS NOTES
"    Occupational Therapy  Occupational Therapy Orthopedic Progress Note    Patient Name: Catarina Marquez  MRN: [id number]  Today's Date: 7/21/2025  Time Calculation  Start Time: 1415  Stop Time: 1500  Time Calculation (min): 45 min    Time:  Time Calculation  Start Time: 1415  Stop Time: 1500  Time Calculation (min): 45 min  OT Therapeutic Procedures Time Entry  Therapeutic Exercise Time Entry: 45    Insurance:  Visit number: 10 of 10 including evaluation   Authorization info:  no authorization  Insurance Type: Payor: [employer] / Plan: [employer] / Product Type: *No Product type* /      General:  Reason for visit: L thumb laceration with FPL repair   Referred by: Chi    Current Problem  1. Thumb laceration, left, initial encounter  Follow Up In Occupational Therapy          Precautions: none         Subjective:  Patient reports \"there are some things that are still tough to do.\"    Current Condition:  Better    Pain:  Pain Assessment: 0-10  0-10 (Numeric) Pain Score: 0 - No pain  Post-tx pain=0/10    Self Reported Function (0-100%) = 100%  - 100% being back to PLOF    Objective:    AROM: L thumb MP E/F=0/70, PIP=0/42, DIP=0/42     Strength #2 R/L= 61/57#, Key Pinch R/L=13/8.5#, 3 point pinch R/L=9.5/9.5#      Outcome Measures: Updated 7/21/2025  UEFI: 68/80    EDUCATION: home exercise program, plan of care, activity modifications, pain management, and injury pathology       Goals: Updated 7/21/2025  Resolved       OT Goals       Patient is independent with entire HEP including splint use.  (Met)       Start:  05/16/25    Expected End:  06/15/25    Resolved:  07/21/25         Patient c/o 2/10 or less pain in L hand/wrist with use during ADL's and home exercises.  (Met)       Start:  05/16/25    Expected End:  06/15/25    Resolved:  07/21/25         PROM: no lags of flexion of L thumb to base of SF at DPC.  (Met)       Start:  05/16/25    Expected End:  06/15/25    Resolved:  07/21/25         L distal UE " AROM is sufficient for independent completion of all ADL's and advanced ADL's.  (Met)       Start:  05/16/25    Expected End:  08/14/25    Resolved:  07/21/25         L distal UE strength is sufficient for independent completion of all ADL's and advanced ADL's.  (Met)       Start:  05/16/25    Expected End:  08/14/25    Resolved:  07/21/25             Plan of care was developed with input and agreement by the patient    Treatments:     Modalities:        Modalities  Modalities Used: Yes  Modality 1: Untimed Fluidotherapy    Therapeutic Exercise:    45 min  Therapeutic Exercise  Therapeutic Exercise Performed: Yes  Therapeutic Exercise Activity 1: thumb AROM x 15 reps each  Therapeutic Exercise Activity 2: medium-soft theraputty x 10 reps each with full , ext roll, ext ring, opposition, and key pinch (medium-soft theraputty was issued for HEP)    Assessment: Patient has met all of her goals. She is independent with all ADL's and home management.        Plan: Recommend discharge at this time with follow-up as needed.        Misa Styles, OT

## 2025-07-28 ENCOUNTER — APPOINTMENT (OUTPATIENT)
Dept: OCCUPATIONAL THERAPY | Facility: HOSPITAL | Age: 40
End: 2025-07-28
Payer: COMMERCIAL

## 2025-12-18 ENCOUNTER — APPOINTMENT (OUTPATIENT)
Dept: OBSTETRICS AND GYNECOLOGY | Facility: CLINIC | Age: 40
End: 2025-12-18
Payer: COMMERCIAL

## (undated) DEVICE — HOLSTER, BOVIE, ES PENCIL, DISP

## (undated) DEVICE — SUTURE, ETHILON, 4-0, 18, PS2, BLACK

## (undated) DEVICE — PREP TRAY, SKIN, DRY, W/GLOVES

## (undated) DEVICE — DRESSING, GAUZE, FLUFF, 1 PLY, 36 X 36 IN

## (undated) DEVICE — Device

## (undated) DEVICE — CUFF, TOURNIQUET, 18 X 4, SNGL PORT/SNGL BLADDER, DISP, LF

## (undated) DEVICE — SOLUTION, IRRIGATION, 0.9% SODIUM CHLORIDE, 1000 ML, HANG BOTTLE